# Patient Record
Sex: MALE | Race: WHITE | URBAN - METROPOLITAN AREA
[De-identification: names, ages, dates, MRNs, and addresses within clinical notes are randomized per-mention and may not be internally consistent; named-entity substitution may affect disease eponyms.]

---

## 2017-05-15 ENCOUNTER — TELEPHONE (OUTPATIENT)
Dept: SLEEP MEDICINE | Facility: CLINIC | Age: 57
End: 2017-05-15

## 2017-05-15 DIAGNOSIS — G47.33 OBSTRUCTIVE SLEEP APNEA: Primary | ICD-10-CM

## 2017-05-15 NOTE — TELEPHONE ENCOUNTER
56 y/o male with mild obstructive sleep apnea 2010 status post UAS right 2011 with Apnex device that is no longer supportable. His current device is not operable and he is requesting explant/implant as a single procedure.  Given baseline disease, he may not qualify.    Plan: New baseline study with clinical followup with me.   Appointment with Dr. Dover for endoscopic exam and initiate medical coverage if qualifies.  Explant by July 2017 to qualify for coverage.  Implant same date if patient qualifies.    Con Iber

## 2017-05-16 DIAGNOSIS — G47.33 OSA (OBSTRUCTIVE SLEEP APNEA): Primary | ICD-10-CM

## 2017-05-16 RX ORDER — CEFAZOLIN SODIUM 1 G/50ML
3 SOLUTION INTRAVENOUS
Status: CANCELLED | OUTPATIENT
Start: 2017-05-16

## 2017-05-16 NOTE — PROGRESS NOTES
Patient is an Apnex trial patient who needs to have his Apnex device removed by July 17. 2017.  Surgical orders placed today.  I will see patient in clinic on June 15th for consultation.

## 2017-05-22 ENCOUNTER — THERAPY VISIT (OUTPATIENT)
Dept: SLEEP MEDICINE | Facility: CLINIC | Age: 57
End: 2017-05-22
Attending: INTERNAL MEDICINE
Payer: COMMERCIAL

## 2017-05-22 DIAGNOSIS — G47.33 OBSTRUCTIVE SLEEP APNEA: ICD-10-CM

## 2017-05-22 PROCEDURE — 95810 POLYSOM 6/> YRS 4/> PARAM: CPT | Mod: ZF

## 2017-05-22 NOTE — MR AVS SNAPSHOT
After Visit Summary   5/22/2017    Eleazar Hummel    MRN: [id number]           Patient Information     Date Of Birth          1960        Visit Information        Provider Department      5/22/2017 8:00 PM SLEEP STUDY RM 6 [facility], [place], Sleep Study        Today's Diagnoses     Obstructive sleep apnea          Care Instructions    [facility]    1. Your sleep study will be reviewed by a sleep physician within the next few days.     2. Please follow up in the sleep clinic as scheduled, or, make an appointment with your sleep provider to be seen within two weeks to discuss the results of the sleep study.    3. If you have any questions or problems with your treatment plan, please contact your sleep clinic provider at [phone] to further manage your condition.    4. Please review your attached medication list, and, at your follow-up appointment advise your sleep clinic provider about any changes.    5. Go to [email or web address] for more information about your sleep problems.    Matilda Brizuela [facility]  May 22, 2017                Follow-ups after your visit        Your next 10 appointments already scheduled     Stanislaw 15, 2017 11:30 AM CDT   (Arrive by 11:15 AM)   NEW SLEEP ENT with Rachael Dover MD   [facility] ([facility])    [address] [place] [address] [id number] [phone]            Stanislaw 15, 2017  1:00 PM CDT   (Arrive by 12:45 PM)   PAC EVALUATION with Uc Pac Kathi 1   [facility] ([facility] [place])    [address] [place] [id number] [phone]            Stanislaw 15, 2017  2:00 PM CDT   (Arrive by 1:45 PM)   PAC RN ASSESSMENT with Uc Pac Rn   [facility] ([facility])    [address] [place] [address] [id number] [phone]             "Stanislaw 15, 2017  2:30 PM CDT   (Arrive by 2:15 PM)   PAC Anesthesia Consult with  Pac Anesthesiologist   [facility] ([facility] [place])    [address] [place] [address]  4th Floor  [facility] [id number] [phone]            Jun 28, 2017   Procedure with Rachael Dover MD   [facility] ([facility])    [address] [place] [address]  5th Floor  [facility] [id number] [phone]           Located in the Clinics and Surgery Center at [address] [place] [address].   parking is very convenient and highly recommended.  is a $6 flat rate fee.  Both  and self parkers should enter the main arrival plaza from [facility]; parking attendants will direct you based on your parking preference.              Who to contact     If you have questions or need follow up information about today's clinic visit or your schedule please contact [facility] directly at [phone].  Normal or non-critical lab and imaging results will be communicated to you by [employer], letter or phone within 4 business days after the clinic has received the results. If you do not hear from us within 7 days, please contact the clinic through [employer] or phone. If you have a critical or abnormal lab result, we will notify you by phone as soon as possible.  Submit refill requests through [employer] or call your pharmacy and they will forward the refill request to us. Please allow 3 business days for your refill to be completed.          Additional Information About Your Visit        [employer] Information     [employer] lets you send messages to your doctor, view your test results, renew your prescriptions, schedule appointments and more. To sign up, go to [email or web address] . Click on \"Log in\" on the left side of the screen, which will take you to the Welcome page. Then click on \"Sign up Now\" on the right side of the " page.     You will be asked to enter the access code listed below, as well as some personal information. Please follow the directions to create your username and password.     Your access code is: [email or web address]  Expires: 2017  2:09 PM     Your access code will  in 90 days. If you need help or a new code, please call your [place] clinic or [phone].        Care EveryWhere ID     This is your Care EveryWhere ID. This could be used by other organizations to access your [place] medical records  [id number]         Blood Pressure from Last 3 Encounters:   10/23/12 112/72   12 123/85   11 129/87    Weight from Last 3 Encounters:   10/23/12 120.7 kg (266 lb)   12 120.2 kg (265 lb)   11 120.7 kg (266 lb)              We Performed the Following     Comprehensive Sleep Study        Primary Care Provider Fax #    [facility] [phone]       [facility] [address]  [facility] [address]        Thank you!     Thank you for choosing [facility], SLEEP STUDY  for your care. Our goal is always to provide you with excellent care. Hearing back from our patients is one way we can continue to improve our services. Please take a few minutes to complete the written survey that you may receive in the mail after your visit with us. Thank you!             Your Updated Medication List - Protect others around you: Learn how to safely use, store and throw away your medicines at www.disposemymeds.org.          This list is accurate as of: 17 11:59 PM.  Always use your most recent med list.                   Brand Name Dispense Instructions for use    aspirin 81 MG tablet      Take 1 tablet by mouth daily.       multivitamin per tablet      Take 1 tablet by mouth daily.

## 2017-05-23 NOTE — PATIENT INSTRUCTIONS
[place] [facility]    1. Your sleep study will be reviewed by a sleep physician within the next few days.     2. Please follow up in the sleep clinic as scheduled, or, make an appointment with your sleep provider to be seen within two weeks to discuss the results of the sleep study.    3. If you have any questions or problems with your treatment plan, please contact your sleep clinic provider at [phone] to further manage your condition.    4. Please review your attached medication list, and, at your follow-up appointment advise your sleep clinic provider about any changes.    5. Go to [email or web address] for more information about your sleep problems.    Matilda Brizuela RPSGT  May 22, 2017

## 2017-05-26 NOTE — PROCEDURES
" SLEEP STUDY INTERPRETATION  POLYSOMNOGRAPHY REPORT      Patient: Eleazar Hummel  YOB: 1960  Study Date: 5/22/2017  MRN: [id number]  Referring Provider: unknown  Ordering Provider: Franck Lake MD    Indications for Polysomnography: The patient is a 57 y old Male who is 6' 5\" and weighs 265.0 lbs.  His BMI is 31.6, [place] sleepiness scale 12.0 and neck size is 47cm.  Relevant medical history includes with a prior history of obstructive sleep apnea and hypoglossal nerve stimulator implant which is no longer functioning.  A diagnostic polysomnogram was performed to evaluate for current severity of sleep apnea.     Polysomnogram Data:  A full night polysomnogram recorded the standard physiologic parameters including EEG, EOG, EMG, ECG, nasal and oral airflow.  Respiratory parameters of chest and abdominal movements were recorded with respiratory inductance plethysmography.  Oxygen saturation was recorded by pulse oximetry.      Sleep Architecture: Frequent arousals attributable to respiratory events and to a lesser extent, limb movements.   The total recording time of the polysomnogram was 478.6 minutes.  The total sleep time was 384.0 minutes.  Sleep latency was normal at 24.6 minutes without the use of a sleep aid.  REM latency was 113.5 minutes.  Arousal index was increased at 73.1 arousals per hour.  Sleep efficiency was decreased at 80.2%.  Wake after sleep onset was 69.5 minutes.  The patient spent 8.7% of total sleep time in Stage N1, 81.4% in Stage N2, 0.0% in Stages N3, and 9.9% in REM.  Time in REM supine was 3.5 minutes.    Respiration: Severe obstructive sleep apnea worse supine..     Events - The polysomnogram revealed a presence of 55 obstructive, 12 central, and 1 mixed apneas resulting in an apnea index of 10.6 events per hour.  There were 136 hypopneas resulting in a hypopnea index of 21.3 events per hour.  The combined apnea/hypopnea index was 31.9 events per hour.  The REM AHI was " 61.6 events per hour.  The supine AHI was 52.5 events per hour.  The RERA index was 24.7 events per hour.   The RDI was 56.6 events per hour.    Snoring - was reported as loud\intermittent.    Respiratory rate and pattern - was notable for respiratory rate and pattern.    Sustained Sleep Associated Hypoventilation - Transcutaneous carbon dioxide monitoring was not used, however significant hypoventilation was not suggested by oximetry.    Sleep Associated Hypoxemia - (Greater than 5 minutes O2 sat below 89%) was not present.  Baseline oxygen saturation was 93.9%. Lowest oxygen saturation was 82.1%.  Time spent less than or equal to 88% was 3.6 minutes.  Time spent less than or equal to 89% was 4.7 minutes.  56.6 24.7 31.9     Movement Activity: Frequent periodic limb movements with arousals.    Periodic Limb Activity - There were 154 PLMs during the entire study. The PLM index was 24.1 movements per hour.  The PLM Arousal Index was 7.3 per hour.    REM EMG Activity - Excessive transient / sustained muscle activity was not present.    Nocturnal Behavior - Abnormal sleep related behaviors were not noted.    Bruxism - None apparent.    Cardiac Summary: Normal sinus rhythm.   The average pulse rate was 67.9 bpm.  The minimum pulse rate was 55.9 bpm while the maximum pulse rate was 101.2 bpm. The rhythm is normal sinus. Arrhythmias were not noted.      Assessment:     Severe obstructive sleep apnea with sleep disruption.     Frequent periodic limb movements with arousals.    Recommendations:    Patient to be re-evaluated with endoscopy for possible neurostimulator vs CPAP therapy.    Consider evaluation for RLS or periodic limb movement disorder.     Advise regarding the risks of drowsy driving.    Suggest optimizing sleep schedule and avoiding sleep deprivation.    Weight management     Diagnostic Codes:     Obstructive Sleep Apnea G47.33    Repetitive Intrusions Into Sleep F51.8          _____________________________________   Electronically-Signed by Franck Lake 11:19 5/26/17

## 2017-06-15 ENCOUNTER — OFFICE VISIT (OUTPATIENT)
Dept: SURGERY | Facility: CLINIC | Age: 57
End: 2017-06-15

## 2017-06-15 ENCOUNTER — ALLIED HEALTH/NURSE VISIT (OUTPATIENT)
Dept: SURGERY | Facility: CLINIC | Age: 57
End: 2017-06-15

## 2017-06-15 ENCOUNTER — ANESTHESIA EVENT (OUTPATIENT)
Dept: SURGERY | Facility: AMBULATORY SURGERY CENTER | Age: 57
End: 2017-06-15

## 2017-06-15 ENCOUNTER — OFFICE VISIT (OUTPATIENT)
Dept: OTOLARYNGOLOGY | Facility: CLINIC | Age: 57
End: 2017-06-15

## 2017-06-15 VITALS
BODY MASS INDEX: 33.4 KG/M2 | OXYGEN SATURATION: 96 % | RESPIRATION RATE: 14 BRPM | SYSTOLIC BLOOD PRESSURE: 129 MMHG | DIASTOLIC BLOOD PRESSURE: 80 MMHG | WEIGHT: 282.9 LBS | HEIGHT: 77 IN | HEART RATE: 70 BPM | TEMPERATURE: 97.7 F

## 2017-06-15 VITALS — WEIGHT: 279 LBS | BODY MASS INDEX: 32.94 KG/M2 | HEIGHT: 77 IN

## 2017-06-15 DIAGNOSIS — G47.33 OSA (OBSTRUCTIVE SLEEP APNEA): Primary | ICD-10-CM

## 2017-06-15 DIAGNOSIS — Z01.818 PREOP EXAMINATION: Primary | ICD-10-CM

## 2017-06-15 RX ORDER — CEFAZOLIN SODIUM 1 G/50ML
3 SOLUTION INTRAVENOUS
Status: CANCELLED | OUTPATIENT
Start: 2017-06-15

## 2017-06-15 RX ORDER — GLYCOPYRROLATE 0.2 MG/ML
0.2 INJECTION, SOLUTION INTRAMUSCULAR; INTRAVENOUS ONCE
Status: CANCELLED | OUTPATIENT
Start: 2017-06-15 | End: 2017-06-15

## 2017-06-15 RX ORDER — OXYMETAZOLINE HYDROCHLORIDE 0.05 G/100ML
2 SPRAY NASAL 2 TIMES DAILY
Status: CANCELLED | OUTPATIENT
Start: 2017-06-15

## 2017-06-15 ASSESSMENT — PAIN SCALES - GENERAL: PAINLEVEL: NO PAIN (0)

## 2017-06-15 ASSESSMENT — LIFESTYLE VARIABLES: TOBACCO_USE: 0

## 2017-06-15 NOTE — PROGRESS NOTES
SLEEP SURGERY CONSULTATION    Patient: Eleazar Hummel  : 1960  CHIEF COMPLAINT:     IDENTIFICATION: Dr. Lake consulted Dr. Dover for surgical evaluation and possible treatment of obstructive sleep apnea syndrome for Eleazar Hummel.    HPI:  Eleazar Hummel is a 57 year old year old male who has Obstructive Sleep Apnea.  The patient has a history of severe obstructive sleep apnea.  The patient reports that when he initially was diagnosed with sleep apnea, he was placed on CPAP.  He had an extremely difficult time tolerating CPAP due to mask discomfort as well as dealing with multiple air leaks.  He tried CPAP for one year without any improvement in his ability to tolerate CPAP.  He has tried three to four different types of masks including full face mask as well as nasal masks.  After CPAP, he then tried an oral appliance.  He was unable to tolerate the oral appliance due to extreme jaw pain.  He then was part of the Apnex hypoglossal nerve stimulator trial.  He underwent implantation of a right hypoglossal nerve stimulator on 2010.  He was able to use the Apnex implant quite well without any significant issues.  There was a device failure early on in which the stimulator needed to be replaced, but this was done so without difficulty.  He felt that the implant had a significant impact on his sleep quality as well as daytime sleepiness.  More recently, he has noticed that the implant is not turning on and since that time he has noticed that his sleep quality has deteriorated.  He is interested in being a candidate for the upper new upper airway stimulation device, Inspire.  He also presents for removal of his Apnex implant.  He recently had a new sleep study performed here at the [facility] on 2017.  His AHI was 31.9.  His lowest oxygen saturation was 82%.  He had 55 obstructive apneas, 12 central apneas, 1 mixed apnea, and 136 hypopneas.       PAST MEDICAL  "HISTORY:  Past Medical History:   Diagnosis Date     Obstructive sleep apnea 2009     BRIANNA (obstructive sleep apnea)        PAST SURGICAL HISTORY:  Past Surgical History:   Procedure Laterality Date     BACK SURGERY  1998    bulging disk     Bladder surgery  1971     ENT SURGERY  2011       MEDICATIONS:  Current Outpatient Prescriptions   Medication Sig Dispense Refill     aspirin 81 MG tablet Take 1 tablet by mouth daily.       Multiple Vitamin (MULTIVITAMIN) per tablet Take 1 tablet by mouth daily.         ALLERGIES:  No Known Allergies    SOCIAL HISTORY:  Social History     Social History     Marital status:      Spouse name: N/A     Number of children: N/A     Years of education: N/A     Occupational History     Not on file.     Social History Main Topics     Smoking status: Never Smoker     Smokeless tobacco: Never Used     Alcohol use Yes      Comment: moderate     Drug use: No     Sexual activity: Yes     Partners: Female     Birth control/ protection: Female Surgical     Other Topics Concern     Not on file     Social History Narrative       FAMILY HISTORY:  Family History   Problem Relation Age of Onset     Hypertension Mother      Migraines Mother      Alzheimer Disease Mother      Colon Cancer Father        REVIEW OF SYSTEMS:   ENT ROS 6/11/2017   Constitutional Unexplained fatigue, Problems with sleep   Musculoskeletal Sore or stiff joints         PHYSICAL EXAM  Ht: 6'5\"  Wt: 265  BMI: 31.6    Constitutional: healthy, alert and no distress  ENT:   MOUTH:   MMM 3, CN 12 intact, though with protrusion, there is a slight deviation to right.    EYES: extraocular movements intact  NECK: The patient had a right neck incision that is well-healed.  His chest incision is intact as well.  I am able to feel the Apnex implant that appears stable.  He does have two rib incisions, one on each side.         ASSESSMENT:  1.  Severe obstructive sleep apnea,  untreated    Incompletely treated sleep apnea elevates " risk of death, cardiovascular disease, and motor vehicle crashes, and it creates deficits in daytime function and quality of life.  Surgical treatment can improve these clinically important outcomes; therefore surgical treatment is medically necessary if the patient is not using CPAP adequately.       PLAN:  1.  We discussed obstructive sleep apnea, including pathophysiology and consequences.  We provided the patient with written information about obstructive sleep apnea and its management.  We discussed the beneficial relationship between weight loss and sleep apnea.      2.  We will be removing the Apnex implant as it no longer a supported therapy.  I discussed with the patient that we will remove the generator.  Typically we will cap off the stim and sense leads.  I did ask the patient if he wanted me to remove the breathing sensing leads and he did not feel it was necessary.      He is interested in Inspire therapy.  He meets three out of the four criteria in terms of sleep apnea severity on recent sleep study, CPAP intolerance as well as oral appliance intolerance.  His BMI is appropriate.  We would next need to proceed with a drug-induced sleep endoscopy in order to determine his airway anatomy.  We can easily tack this on prior to his implant removal.  I explained to him that the therapy would work very similarly to Apnex.      I also discussed with him what is involved with insurance authorization and so he is aware of that pathway.  He understands that we will not be able to explant and implant him as a single procedure.  He is okay with this.       I spent 35 minutes face-to-face with Eleazar Hummel during today's office visit, of which more than 50% was spent on counseling and coordination of care, which included discussion of pathophysiology of patient's obstructive sleep apnea, treatment options, risks and benefits of each option.

## 2017-06-15 NOTE — LETTER
6/15/2017       RE: Eleazar Hummel  [address] [facility] [id number]     Dear Colleague,    Thank you for referring your patient, Eleazar Hummel, to the [facility] EAR [facility] THROAT at [facility]. Please see a copy of my visit note below.        SLEEP SURGERY CONSULTATION    Patient: Eleazar Hummel  : 1960  CHIEF COMPLAINT:     IDENTIFICATION: Dr. Lake consulted Dr. Dover for surgical evaluation and possible treatment of obstructive sleep apnea syndrome for Eleazar Hummel.    HPI:  Eleazar Hummel is a 57 year old year old male who has Obstructive Sleep Apnea.  The patient has a history of severe obstructive sleep apnea.  The patient reports that when he initially was diagnosed with sleep apnea, he was placed on CPAP.  He had an extremely difficult time tolerating CPAP due to mask discomfort as well as dealing with multiple air leaks.  He tried CPAP for one year without any improvement in his ability to tolerate CPAP.  He has tried three to four different types of masks including full face mask as well as nasal masks.  After CPAP, he then tried an oral appliance.  He was unable to tolerate the oral appliance due to extreme jaw pain.  He then was part of the Apnex hypoglossal nerve stimulator trial.  He underwent implantation of a right hypoglossal nerve stimulator on 2010.  He was able to use the Apnex implant quite well without any significant issues.  There was a device failure early on in which the stimulator needed to be replaced, but this was done so without difficulty.  He felt that the implant had a significant impact on his sleep quality as well as daytime sleepiness.  More recently, he has noticed that the implant is not turning on and since that time he has noticed that his sleep quality has deteriorated.  He is interested in being a candidate for the upper new upper airway stimulation device, Inspire.  He also presents  "for removal of his Apnex implant.  He recently had a new sleep study performed here at the [facility] on 05/22/2017.  His AHI was 31.9.  His lowest oxygen saturation was 82%.  He had 55 obstructive apneas, 12 central apneas, 1 mixed apnea, and 136 hypopneas.       PAST MEDICAL HISTORY:  Past Medical History:   Diagnosis Date     Obstructive sleep apnea 2009     BRIANNA (obstructive sleep apnea)        PAST SURGICAL HISTORY:  Past Surgical History:   Procedure Laterality Date     BACK SURGERY  1998    bulging disk     Bladder surgery  1971     ENT SURGERY  2011       MEDICATIONS:  Current Outpatient Prescriptions   Medication Sig Dispense Refill     aspirin 81 MG tablet Take 1 tablet by mouth daily.       Multiple Vitamin (MULTIVITAMIN) per tablet Take 1 tablet by mouth daily.         ALLERGIES:  No Known Allergies    SOCIAL HISTORY:  Social History     Social History     Marital status:      Spouse name: N/A     Number of children: N/A     Years of education: N/A     Occupational History     Not on file.     Social History Main Topics     Smoking status: Never Smoker     Smokeless tobacco: Never Used     Alcohol use Yes      Comment: moderate     Drug use: No     Sexual activity: Yes     Partners: Female     Birth control/ protection: Female Surgical     Other Topics Concern     Not on file     Social History Narrative       FAMILY HISTORY:  Family History   Problem Relation Age of Onset     Hypertension Mother      Migraines Mother      Alzheimer Disease Mother      Colon Cancer Father        REVIEW OF SYSTEMS:   ENT ROS 6/11/2017   Constitutional Unexplained fatigue, Problems with sleep   Musculoskeletal Sore or stiff joints         PHYSICAL EXAM  Ht: 6'5\"  Wt: 265  BMI: 31.6    Constitutional: healthy, alert and no distress  ENT:   MOUTH:   MMM 3, CN 12 intact, though with protrusion, there is a slight deviation to right.    EYES: extraocular movements intact  NECK: The patient had a right neck " incision that is well-healed.  His chest incision is intact as well.  I am able to feel the Apnex implant that appears stable.  He does have two rib incisions, one on each side.         ASSESSMENT:  1.  Severe obstructive sleep apnea,  untreated    Incompletely treated sleep apnea elevates risk of death, cardiovascular disease, and motor vehicle crashes, and it creates deficits in daytime function and quality of life.  Surgical treatment can improve these clinically important outcomes; therefore surgical treatment is medically necessary if the patient is not using CPAP adequately.       PLAN:  1.  We discussed obstructive sleep apnea, including pathophysiology and consequences.  We provided the patient with written information about obstructive sleep apnea and its management.  We discussed the beneficial relationship between weight loss and sleep apnea.      2.  We will be removing the Apnex implant as it no longer a supported therapy.  I discussed with the patient that we will remove the generator.  Typically we will cap off the stim and sense leads.  I did ask the patient if he wanted me to remove the breathing sensing leads and he did not feel it was necessary.      He is interested in Inspire therapy.  He meets three out of the four criteria in terms of sleep apnea severity on recent sleep study, CPAP intolerance as well as oral appliance intolerance.  His BMI is appropriate.  We would next need to proceed with a drug-induced sleep endoscopy in order to determine his airway anatomy.  We can easily tack this on prior to his implant removal.  I explained to him that the therapy would work very similarly to Apnex.      I also discussed with him what is involved with insurance authorization and so he is aware of that pathway.  He understands that we will not be able to explant and implant him as a single procedure.  He is okay with this.       I spent 35 minutes face-to-face with Eleazar Hummel during today's  office visit, of which more than 50% was spent on counseling and coordination of care, which included discussion of pathophysiology of patient's obstructive sleep apnea, treatment options, risks and benefits of each option.      Again, thank you for allowing me to participate in the care of your patient.      Sincerely,    Rachael Dover MD

## 2017-06-15 NOTE — MR AVS SNAPSHOT
After Visit Summary   6/15/2017    Eleazar Hummel    MRN: [id number]           Patient Information     Date Of Birth          1960        Visit Information        Provider Department      6/15/2017 11:30 AM Rachael Dover MD [facility]        Today's Diagnoses     BRIANNA (obstructive sleep apnea)    -  1       Follow-ups after your visit        Your next 10 appointments already scheduled     Jun 28, 2017   Procedure with Rachael Dover MD   [facility] ([facility])    [address] [place] [address]  5th [facility] [address] [phone]           Located in the Clinics and Surgery Center at [address] [place] [address].   parking is very convenient and highly recommended.  is a $6 flat rate fee.  Both  and self parkers should enter the main arrival plaza from [facility]; parking attendants will direct you based on your parking preference.              Who to contact     Please call your clinic at [phone] to:    Ask questions about your health    Make or cancel appointments    Discuss your medicines    Learn about your test results    Speak to your doctor   If you have compliments or concerns about an experience at your clinic, or if you wish to file a complaint, please contact [facility] Patient Relations at [phone] or email us at [email or web address]         Additional Information About Your Visit        MyChart Information     Kapturt gives you secure access to your electronic health record. If you see a primary care provider, you can also send messages to your care team and make appointments. If you have questions, please call your primary care clinic.  If you do not have a primary care provider, please call [phone] and they will assist you.      IdealSeat is an electronic gateway that provides easy, online access to your  "medical records. With PayNearMe, you can request a clinic appointment, read your test results, renew a prescription or communicate with your care team.     To access your existing account, please contact your [facility] or call [phone] for assistance.        Care EveryWhere ID     This is your Care EveryWhere ID. This could be used by other organizations to access your [place] medical records  [id number]        Your Vitals Were     Height BMI (Body Mass Index)                1.945 m (6' 4.58\") 33.45 kg/m2           Blood Pressure from Last 3 Encounters:   06/15/17 129/80   10/23/12 112/72   07/24/12 123/85    Weight from Last 3 Encounters:   06/15/17 128.3 kg (282 lb 14.4 oz)   06/15/17 126.6 kg (279 lb)   10/23/12 120.7 kg (266 lb)              We Performed the Following     Ashlee-Operative Worksheet (ENT General)        Primary Care Provider Fax #    [facility] [phone]       [facility] [address]  [facility] [address]        Thank you!     Thank you for choosing [facility]  for your care. Our goal is always to provide you with excellent care. Hearing back from our patients is one way we can continue to improve our services. Please take a few minutes to complete the written survey that you may receive in the mail after your visit with us. Thank you!             Your Updated Medication List - Protect others around you: Learn how to safely use, store and throw away your medicines at www.disposemymeds.org.      Notice  As of 6/15/2017 11:59 PM    You have not been prescribed any medications.      "

## 2017-06-15 NOTE — NURSING NOTE
Chief Complaint   Patient presents with     Consult     New Sleep ENT Discuss apnex removal EMR     Pt states no pain today.    N Ramses PRATERN

## 2017-06-15 NOTE — MR AVS SNAPSHOT
After Visit Summary   6/15/2017    Eleazar Hummel    MRN: [id number]           Patient Information     Date Of Birth          1960        Visit Information        Provider Department      6/15/2017 2:00 PM Rn, [facility]        Care Instructions    Preparing for Your Surgery      Name:  Eleazar Hummel   MRN:  [id number]   :  1960   Today's Date:  6/15/2017     Arriving for surgery:  Surgery date:  17  Surgery time:  8:50 am  Arrival time:  7:20 am  Please come to:     [facility]  [address] [place] [id number]     Parking is available in front of the Clinics and Surgery Center building from 5:30AM to 8:00PM.  -  Proceed to the 5th floor to check into the Ambulatory Surgery Center.              >> There will be patient concierges on the 1st and 5th floor, for assistance or an escort, if you would like.              >> Please call [phone] with any questions.    What can I eat or drink?  -  You may have solid food or milk products until 8 hours prior to your surgery.  -  You may have water, apple juice or 7up/Sprite until 2 hours prior to your surgery.    Which medicines can I take?  -  Please take these medications the day of surgery: Tylenol is OK if needed      How do I prepare myself?  -  Take two showers: one the night before surgery; and one the morning of surgery.         Use Scrubcare or Hibiclens to wash from neck down.  You may use your own shampoo and conditioner. No other hair products.   -  Do NOT use lotion, powder, deodorant, or antiperspirant the day of your surgery.  -  Do NOT wear any makeup, fingernail polish or jewelry.  -  Do not bring your own medications to the hospital, except for inhalers and eye drops.  -  Bring your ID and insurance card.    Questions or Concerns:  If you have questions or concerns, please call the  Preoperative Assessment Center, Monday-Friday  7AM-7PM:  [phone]              AFTER YOUR SURGERY  Breathing exercises   Breathing exercises help you recover faster. Take deep breaths and let the air out slowly. This will:     Help you wake up after surgery.    Help prevent complications like pneumonia.  Preventing complications will help you go home sooner.   We may give you a breathing device (incentive spirometer) to encourage you to breathe deeply.   Nausea and vomiting   You may feel sick to your stomach after surgery; if so, let your nurse know.    Pain control:  After surgery, you may have pain. Our goal is to help you manage your pain. Pain medicine will help you feel comfortable enough to do activities that will help you heal.  These activities may include breathing exercises, walking and physical therapy.   To help your health care team treat your pain we will ask: 1) If you have pain  2) where it is located 3) describe your pain in your words  Methods of pain control include medications given by mouth, vein or by nerve block for some surgeries.  We may give you a pain control pump that will:  1) Deliver the medicine through a tube placed in your vein  2) Control the amount of medicine you receive  3) Allow you to push a button to deliver a dose of pain medicine  Sequential Compression Device (SCD) or Pneumo Boots:  You may need to wear SCD S on your legs or feet. These are wraps connected to a machine that pumps in air and releases it. The repeated pumping helps prevent blood clots from forming.           Follow-ups after your visit        Your next 10 appointments already scheduled     Stanislaw 15, 2017  2:00 PM CDT   (Arrive by 1:45 PM)   PAC RN ASSESSMENT with Viky Pac Rn   [facility] Preoperative [facility] ([facility])    [address] [id number] [phone]            Stanislaw 15, 2017  2:30 PM CDT   (Arrive by 2:15 PM)   PAC Anesthesia Consult with Viky Pac Anesthesiologist   [facility]  Pre[facility] ([facility])    [address] [place] [address]  4th Floor  [facility] [address] [phone]            Stanislaw 15, 2017  2:45 PM CDT   LAB with  LAB   [facility] ([facility])    [address] [place] [address]  1st [facility] [address] [phone]           Patient must bring picture ID.  Patient should be prepared to give a urine specimen  Please do not eat 10-12 hours before your appointment if you are coming in fasting for labs on lipids, cholesterol, or glucose (sugar).  Pregnant women should follow their Care Team instructions. Water with medications is okay. Do not drink coffee or other fluids.   If you have concerns about taking  your medications, please ask at office or if scheduling via [facility], send a message by clicking on Secure Messaging, Message Your Care Team.            Jun 28, 2017   Procedure with Rachael Dover MD   [facility] ([facility])    [address] [place] [address]  5th [facility] [address] [phone]           Located in the Clinics and Surgery Center at [address] [place] [address].   parking is very convenient and highly recommended.  is a $6 flat rate fee.  Both  and self parkers should enter the main arrival plaza from [facility]; parking attendants will direct you based on your parking preference.              Who to contact     Please call your clinic at [phone] to:    Ask questions about your health    Make or cancel appointments    Discuss your medicines    Learn about your test results    Speak to your doctor   If you have compliments or concerns about an experience at your clinic, or if you wish to file a complaint, please contact [facility] Patient Relations at [phone] or email us at [email or web address]         Additional Information About Your Visit         Sion Powerhart Information     Ferevo gives you secure access to your electronic health record. If you see a primary care provider, you can also send messages to your care team and make appointments. If you have questions, please call your primary care clinic.  If you do not have a primary care provider, please call [phone] and they will assist you.      Ferevo is an electronic gateway that provides easy, online access to your medical records. With Ferevo, you can request a clinic appointment, read your test results, renew a prescription or communicate with your care team.     To access your existing account, please contact your [facility] or call [phone] for assistance.        Care EveryWhere ID     This is your Care EveryWhere ID. This could be used by other organizations to access your [place] medical records  [id number]         Blood Pressure from Last 3 Encounters:   06/15/17 129/80   10/23/12 112/72   07/24/12 123/85    Weight from Last 3 Encounters:   06/15/17 128.3 kg (282 lb 14.4 oz)   06/15/17 126.6 kg (279 lb)   10/23/12 120.7 kg (266 lb)              Today, you had the following     No orders found for display       Primary Care Provider Fax #    [facility] [phone]       [facility] [address] [place]  [facility] [address]        Thank you!     Thank you for choosing [facility] PREOPERATIVE ASSESSMENT [facility]  for your care. Our goal is always to provide you with excellent care. Hearing back from our patients is one way we can continue to improve our services. Please take a few minutes to complete the written survey that you may receive in the mail after your visit with us. Thank you!             Your Updated Medication List - Protect others around you: Learn how to safely use, store and throw away your medicines at www.disposemymeds.org.      Notice  As of 6/15/2017  1:14 PM    You have not been prescribed any medications.

## 2017-06-15 NOTE — H&P
Pre-Operative H & P     CC:  Preoperative exam to assess for increased cardiopulmonary risk while undergoing surgery and anesthesia.    Date of Encounter: 6/15/2017  Primary Care Physician:  [facility]    [facility]  Eleazar Hummel is a 57 year old male who presents for pre-operative H & P in preparation for drug induced sleep endoscopy, removal of right Apnex implant with Dr. Dover on 6/2817 at [facility]. History is obtained from the patient.     Patient with moderate BRIANNA s/p hypoglossal nerve stimulator placed and later revised in 2010-11, as part of a Apnex clinical study. He consulted recently with Dr. Dover in preparation for removal, follow up evaluation and consideration for Inspire device placement. He has found the nerve stimulator to be very helpful in treatment of his BRIANNA symptoms.    Past Medical History  Past Medical History:   Diagnosis Date     Obstructive sleep apnea 2009       Past Surgical History  Past Surgical History:   Procedure Laterality Date     BACK SURGERY  1998    bulging disk     Bladder surgery  1971     COLONOSCOPY       Implantation of hypoglossal nerve stimulator Right 2010     Revision of right hypoglossal nerve stimulator  2011       Hx of Blood transfusions/reactions: Denies.      Hx of abnormal bleeding or anti-platelet use: Denies.    Menstrual history: No LMP for male patient.    Steroid use in the last year: Denies.     Personal or FH with difficulty with Anesthesia:  Denies.    Prior to Admission Medications  No current outpatient prescriptions on file.       Allergies  No Known Allergies    Social History  Social History     Social History     Marital status:      Spouse name: N/A     Number of children: N/A     Years of education: N/A     Occupational History     Not on file.     Social History Main Topics     Smoking status: Never Smoker     Smokeless tobacco: Never Used     Alcohol use Yes      Comment: occasional     Drug  "use: No     Sexual activity: Yes     Partners: Female     Birth control/ protection: Female Surgical     Other Topics Concern     Not on file     Social History Narrative       Family History  Family History   Problem Relation Age of Onset     Hypertension Mother      Migraines Mother      Alzheimer Disease Mother      Colon Cancer Father        Review of Systems  The complete review of systems is negative other than noted in the HPI or here.   Constitutional: Denies fever, chills, weight loss.  HEENT: Wears glasses for vision.  Respiratory: Denies cough or shortness of breath. Able to lie flat.  CV: Denies chest pain or irregular HR. Generally good exercise tolerance but doesn't exercise much.  GI: Denies abdominal pain or bowel issues.  : Denies dysuria.  M/S: Knee pain.    Temp: 97.7  F (36.5  C) Temp src: Oral BP: 129/80 Pulse: 70   Resp: 14 SpO2: 96 %         282 lbs 14.4 oz  6' 4.5\"   Body mass index is 33.99 kg/(m^2).       Physical Exam  Constitutional: Awake, alert, cooperative, no apparent distress, and appears stated age.  Eyes: Pupils equal, round and reactive to light, extra ocular muscles intact, sclera clear, conjunctiva normal. Glasses on.  HENT: Normocephalic, oral pharynx with moist mucus membranes, good dentition. No goiter appreciated.   Respiratory: Clear to auscultation bilaterally, no crackles or wheezing. No cough or obvious dyspnea.  Cardiovascular: Regular rate and rhythm, normal S1 and S2, and no murmur noted. Carotids, no bruits. No edema. Palpable pulses to radial  DP and PT arteries.   GI: Normal bowel sounds, soft, non-distended, non-tender, no masses palpated, soft umbilical hernia noted. Difficult exam due to obese abdomen  Lymph/Hematologic: No cervical lymphadenopathy and no supraclavicular lymphadenopathy.  Genitourinary: Deferred.  Skin: Warm and dry.  No rashes at anticipated surgical site.   Musculoskeletal: Full ROM of neck. There is no redness, warmth, or swelling of the " joints. Gross motor strength is normal.    Neurologic: Awake, alert, oriented to name, place and time. Cranial nerves II-XII are grossly intact. Gait is normal.   Neuropsychiatric: Calm, cooperative. Normal affect.     Labs: Not indicated.  EKG: Not indicated.    Outside records reviewed from: Care Everywhere    ASSESSMENT and PLAN  Eleazar Hummel is a 57 year old male scheduled to undergo drug induced sleep endoscopy, removal of right Apnex implant with Dr. Dover on 6/2817. He has the following specific operative considerations:   - RCRI : No serious cardiac risks.    - Anesthesia considerations:  Refer to PAC assessment in anesthesia records  - Risk of PONV score = 1.  If > 2, anti-emetic intervention recommended.     --Moderate BRIANNA s/p hypoglossal nerve stimulator placed in 2010, and now scheduled to be removed. He will be reevaluated in consideration of the Inspire device.    --Generally healthy male with no significant cardiopulmonary history. Nonsmoker. Good activity tolerance. Mild central obesity.  Arrival time, NPO, shower and medication instructions provided by nursing staff today. Preparing For Your Surgery handout given.    Patient was discussed with Dr Jaramillo.    HOA Perez CNS  Preoperative Assessment Center  [facility]  Clinic and Surgery Center  Phone: [phone]  Fax: [phone]

## 2017-06-15 NOTE — ANESTHESIA PREPROCEDURE EVALUATION
Anesthesia Evaluation     . Pt has had prior anesthetic. Type: General and MAC    No history of anesthetic complications          ROS/MED HX    ENT/Pulmonary: Comment: Right hypoglossal nerve stimulator    (+)sleep apnea, doesn't use CPAP , . .   (-) tobacco use   Neurologic:  - neg neurologic ROS     Cardiovascular:  - neg cardiovascular ROS   (+) ----. : . . . :. . No previous cardiac testing       METS/Exercise Tolerance:  >4 METS   Hematologic:  - neg hematologic  ROS       Musculoskeletal:   (+) , , other musculoskeletal- knee pain      GI/Hepatic:  - neg GI/hepatic ROS       Renal/Genitourinary:  - ROS Renal section negative       Endo:     (+) Obesity, .      Psychiatric:  - neg psychiatric ROS       Infectious Disease:  - neg infectious disease ROS       Malignancy:      - no malignancy   Other:    (+) No chance of pregnancy C-spine cleared: N/A, no H/O Chronic Pain,no other significant disability                    Physical Exam  Normal systems: dental    Airway   Mallampati: I  TM distance: >3 FB  Neck ROM: full    Dental     Cardiovascular   Rhythm and rate: regular and normal      Pulmonary    breath sounds clear to auscultation    Other findings: For further details of assessment, testing, and physical exam please see H and P completed on same date.           PAC Discussion and Assessment    ASA Classification: 1  Case is suitable for: ASC  Anesthetic techniques and relevant risks discussed: GA  Invasive monitoring and risk discussed: No  Types:   Possibility and Risk of blood transfusion discussed: No  NPO instructions given:   Additional anesthetic preparation and risks discussed:   Needs early admission to pre-op area:   Other:     PAC Resident/NP Anesthesia Assessment:  Eleazar Hummel is a 57 year old male scheduled to undergo drug induced sleep endoscopy, removal of right Apnex implant with Dr. Dover on 6/2817. He has the following specific operative considerations:   - RCRI : No serious  cardiac risks.    - Risk of PONV score = 1.  If > 2, anti-emetic intervention recommended.    No history of problems with anesthesia.     --Moderate BRIANNA s/p hypoglossal nerve stimulator placed in 2010, and now scheduled to be removed. He will be reevaluated in consideration of the Inspire device.    --Generally healthy male with no significant cardiopulmonary history. Nonsmoker. Good activity tolerance. Mild central obesity.      Patient was discussed with Dr Jaramillo.      Reviewed and Signed by PAC Mid-Level Provider/Resident  Mid-Level Provider/Resident: HOA Roque, CNS  Date: 6/15/17  Time: 1:23pm    Attending Anesthesiologist Anesthesia Assessment:  57 year old for drug induced sleep endoscopy removal of right Apnex implant, possible implant Inspire. Chart reviewed, patient seen and evaluated; agree with above assessment. Patient has no significant cardiac or pulmonary disease      Patient is appropriate for the planned procedure without further workup or medical management change. The final anesthesia plan will be determined by the physician anesthesiologist caring for the patient on the day of surgery.      Reviewed and Signed by PAC Anesthesiologist  Anesthesiologist: indigo  Date: 6/15/2017  Time:   Pass/Fail: Pass  Disposition:     PAC Pharmacist Assessment:        Pharmacist:   Date:   Time:      Anesthesia Plan      History & Physical Review  History and physical reviewed and following examination; no interval change.    ASA Status:  2 .    NPO Status:  > 8 hours    Plan for General and LMA with Intravenous and Propofol induction. Maintenance will be Inhalation.    PONV prophylaxis:  Ondansetron (or other 5HT-3)       Postoperative Care  Postoperative pain management:  IV analgesics, Oral pain medications and Multi-modal analgesia.      Consents                          .

## 2017-06-15 NOTE — PATIENT INSTRUCTIONS
Preparing for Your Surgery      Name:  Eleazar Hummel   MRN:  [id number]   :  1960   Today's Date:  6/15/2017     Arriving for surgery:  Surgery date:  17  Surgery time:  8:50 am  Arrival time:  7:20 am  Please come to:     [facility]  [address] [place] [id number]     Parking is available in front of the Clinics and Surgery Center building from 5:30AM to 8:00PM.  -  Proceed to the 5th floor to check into the Ambulatory Surgery Center.              >> There will be patient concierges on the 1st and 5th floor, for assistance or an escort, if you would like.              >> Please call [phone] with any questions.    What can I eat or drink?  -  You may have solid food or milk products until 8 hours prior to your surgery.  -  You may have water, apple juice or 7up/Sprite until 2 hours prior to your surgery.    Which medicines can I take?  -  Please take these medications the day of surgery: Tylenol is OK if needed      How do I prepare myself?  -  Take two showers: one the night before surgery; and one the morning of surgery.         Use Scrubcare or Hibiclens to wash from neck down.  You may use your own shampoo and conditioner. No other hair products.   -  Do NOT use lotion, powder, deodorant, or antiperspirant the day of your surgery.  -  Do NOT wear any makeup, fingernail polish or jewelry.  -  Do not bring your own medications to the hospital, except for inhalers and eye drops.  -  Bring your ID and insurance card.    Questions or Concerns:  If you have questions or concerns, please call the  Preoperative Assessment Center, Monday-Friday 7AM-7PM:  [phone]              AFTER YOUR SURGERY  Breathing exercises   Breathing exercises help you recover faster. Take deep breaths and let the air out slowly. This will:     Help you wake up after surgery.    Help prevent complications like pneumonia.  Preventing complications will help you go home  sooner.   We may give you a breathing device (incentive spirometer) to encourage you to breathe deeply.   Nausea and vomiting   You may feel sick to your stomach after surgery; if so, let your nurse know.    Pain control:  After surgery, you may have pain. Our goal is to help you manage your pain. Pain medicine will help you feel comfortable enough to do activities that will help you heal.  These activities may include breathing exercises, walking and physical therapy.   To help your health care team treat your pain we will ask: 1) If you have pain  2) where it is located 3) describe your pain in your words  Methods of pain control include medications given by mouth, vein or by nerve block for some surgeries.  We may give you a pain control pump that will:  1) Deliver the medicine through a tube placed in your vein  2) Control the amount of medicine you receive  3) Allow you to push a button to deliver a dose of pain medicine  Sequential Compression Device (SCD) or Pneumo Boots:  You may need to wear SCD S on your legs or feet. These are wraps connected to a machine that pumps in air and releases it. The repeated pumping helps prevent blood clots from forming.

## 2017-06-28 ENCOUNTER — HOSPITAL ENCOUNTER (OUTPATIENT)
Facility: AMBULATORY SURGERY CENTER | Age: 57
End: 2017-06-28
Attending: OTOLARYNGOLOGY

## 2017-06-28 ENCOUNTER — SURGERY (OUTPATIENT)
Age: 57
End: 2017-06-28

## 2017-06-28 ENCOUNTER — ANESTHESIA (OUTPATIENT)
Dept: SURGERY | Facility: AMBULATORY SURGERY CENTER | Age: 57
End: 2017-06-28

## 2017-06-28 VITALS
DIASTOLIC BLOOD PRESSURE: 85 MMHG | TEMPERATURE: 97 F | WEIGHT: 280 LBS | SYSTOLIC BLOOD PRESSURE: 124 MMHG | BODY MASS INDEX: 33.06 KG/M2 | HEIGHT: 77 IN | OXYGEN SATURATION: 95 % | RESPIRATION RATE: 12 BRPM | HEART RATE: 71 BPM

## 2017-06-28 DIAGNOSIS — G89.18 POST-OP PAIN: Primary | ICD-10-CM

## 2017-06-28 DIAGNOSIS — G47.33 OSA (OBSTRUCTIVE SLEEP APNEA): Primary | ICD-10-CM

## 2017-06-28 RX ORDER — CEFAZOLIN SODIUM 1 G/50ML
3 SOLUTION INTRAVENOUS
Status: COMPLETED | OUTPATIENT
Start: 2017-06-28 | End: 2017-06-28

## 2017-06-28 RX ORDER — GLYCOPYRROLATE 0.2 MG/ML
0.2 INJECTION, SOLUTION INTRAMUSCULAR; INTRAVENOUS ONCE
Status: COMPLETED | OUTPATIENT
Start: 2017-06-28 | End: 2017-06-28

## 2017-06-28 RX ORDER — SODIUM CHLORIDE, SODIUM LACTATE, POTASSIUM CHLORIDE, CALCIUM CHLORIDE 600; 310; 30; 20 MG/100ML; MG/100ML; MG/100ML; MG/100ML
INJECTION, SOLUTION INTRAVENOUS CONTINUOUS
Status: DISCONTINUED | OUTPATIENT
Start: 2017-06-28 | End: 2017-06-28 | Stop reason: HOSPADM

## 2017-06-28 RX ORDER — CEFAZOLIN SODIUM 1 G/3ML
1 INJECTION, POWDER, FOR SOLUTION INTRAMUSCULAR; INTRAVENOUS SEE ADMIN INSTRUCTIONS
Status: DISCONTINUED | OUTPATIENT
Start: 2017-06-28 | End: 2017-06-28 | Stop reason: HOSPADM

## 2017-06-28 RX ORDER — NALOXONE HYDROCHLORIDE 0.4 MG/ML
.1-.4 INJECTION, SOLUTION INTRAMUSCULAR; INTRAVENOUS; SUBCUTANEOUS
Status: DISCONTINUED | OUTPATIENT
Start: 2017-06-28 | End: 2017-06-29 | Stop reason: HOSPADM

## 2017-06-28 RX ORDER — OXYMETAZOLINE HYDROCHLORIDE 0.05 G/100ML
SPRAY NASAL PRN
Status: DISCONTINUED | OUTPATIENT
Start: 2017-06-28 | End: 2017-06-28 | Stop reason: HOSPADM

## 2017-06-28 RX ORDER — CEFAZOLIN SODIUM 1 G/50ML
3 SOLUTION INTRAVENOUS
Status: CANCELLED | OUTPATIENT
Start: 2017-06-28

## 2017-06-28 RX ORDER — HYDROCODONE BITARTRATE AND ACETAMINOPHEN 5; 325 MG/1; MG/1
1-2 TABLET ORAL EVERY 4 HOURS PRN
Qty: 15 TABLET | Refills: 0 | Status: SHIPPED | OUTPATIENT
Start: 2017-06-28 | End: 2017-07-11

## 2017-06-28 RX ORDER — CEFAZOLIN SODIUM 1 G/50ML
3 SOLUTION INTRAVENOUS
Status: DISCONTINUED | OUTPATIENT
Start: 2017-06-28 | End: 2017-06-28 | Stop reason: HOSPADM

## 2017-06-28 RX ORDER — PROPOFOL 10 MG/ML
INJECTION, EMULSION INTRAVENOUS CONTINUOUS PRN
Status: DISCONTINUED | OUTPATIENT
Start: 2017-06-28 | End: 2017-06-28

## 2017-06-28 RX ORDER — PROPOFOL 10 MG/ML
INJECTION, EMULSION INTRAVENOUS PRN
Status: DISCONTINUED | OUTPATIENT
Start: 2017-06-28 | End: 2017-06-28

## 2017-06-28 RX ORDER — SODIUM CHLORIDE, SODIUM LACTATE, POTASSIUM CHLORIDE, CALCIUM CHLORIDE 600; 310; 30; 20 MG/100ML; MG/100ML; MG/100ML; MG/100ML
INJECTION, SOLUTION INTRAVENOUS CONTINUOUS
Status: DISCONTINUED | OUTPATIENT
Start: 2017-06-28 | End: 2017-06-29 | Stop reason: HOSPADM

## 2017-06-28 RX ORDER — HYDROCODONE BITARTRATE AND ACETAMINOPHEN 5; 325 MG/1; MG/1
1 TABLET ORAL
Status: COMPLETED | OUTPATIENT
Start: 2017-06-28 | End: 2017-06-28

## 2017-06-28 RX ORDER — LIDOCAINE HYDROCHLORIDE 40 MG/ML
SOLUTION TOPICAL PRN
Status: DISCONTINUED | OUTPATIENT
Start: 2017-06-28 | End: 2017-06-28 | Stop reason: HOSPADM

## 2017-06-28 RX ORDER — FENTANYL CITRATE 50 UG/ML
INJECTION, SOLUTION INTRAMUSCULAR; INTRAVENOUS PRN
Status: DISCONTINUED | OUTPATIENT
Start: 2017-06-28 | End: 2017-06-28

## 2017-06-28 RX ORDER — LIDOCAINE 40 MG/G
CREAM TOPICAL
Status: DISCONTINUED | OUTPATIENT
Start: 2017-06-28 | End: 2017-06-28 | Stop reason: HOSPADM

## 2017-06-28 RX ORDER — ONDANSETRON 2 MG/ML
INJECTION INTRAMUSCULAR; INTRAVENOUS PRN
Status: DISCONTINUED | OUTPATIENT
Start: 2017-06-28 | End: 2017-06-28

## 2017-06-28 RX ORDER — BACITRACIN 500 [USP'U]/G
1 OINTMENT OPHTHALMIC 2 TIMES DAILY
Qty: 3.5 G | Refills: 1 | Status: SHIPPED | OUTPATIENT
Start: 2017-06-28 | End: 2017-07-05

## 2017-06-28 RX ORDER — OXYMETAZOLINE HYDROCHLORIDE 0.05 G/100ML
2 SPRAY NASAL 2 TIMES DAILY
Status: DISCONTINUED | OUTPATIENT
Start: 2017-06-28 | End: 2017-06-28 | Stop reason: HOSPADM

## 2017-06-28 RX ORDER — DEXAMETHASONE SODIUM PHOSPHATE 4 MG/ML
INJECTION, SOLUTION INTRA-ARTICULAR; INTRALESIONAL; INTRAMUSCULAR; INTRAVENOUS; SOFT TISSUE PRN
Status: DISCONTINUED | OUTPATIENT
Start: 2017-06-28 | End: 2017-06-28

## 2017-06-28 RX ORDER — LIDOCAINE HYDROCHLORIDE 20 MG/ML
INJECTION, SOLUTION INFILTRATION; PERINEURAL PRN
Status: DISCONTINUED | OUTPATIENT
Start: 2017-06-28 | End: 2017-06-28

## 2017-06-28 RX ORDER — FENTANYL CITRATE 50 UG/ML
25-50 INJECTION, SOLUTION INTRAMUSCULAR; INTRAVENOUS
Status: DISCONTINUED | OUTPATIENT
Start: 2017-06-28 | End: 2017-06-28 | Stop reason: HOSPADM

## 2017-06-28 RX ORDER — EPHEDRINE SULFATE 50 MG/ML
INJECTION, SOLUTION INTRAMUSCULAR; INTRAVENOUS; SUBCUTANEOUS PRN
Status: DISCONTINUED | OUTPATIENT
Start: 2017-06-28 | End: 2017-06-28

## 2017-06-28 RX ORDER — DEXAMETHASONE SODIUM PHOSPHATE 10 MG/ML
10 INJECTION, SOLUTION INTRAMUSCULAR; INTRAVENOUS ONCE
Status: CANCELLED | OUTPATIENT
Start: 2017-06-28 | End: 2017-06-28

## 2017-06-28 RX ORDER — ONDANSETRON 4 MG/1
4 TABLET, ORALLY DISINTEGRATING ORAL EVERY 30 MIN PRN
Status: DISCONTINUED | OUTPATIENT
Start: 2017-06-28 | End: 2017-06-29 | Stop reason: HOSPADM

## 2017-06-28 RX ORDER — ONDANSETRON 2 MG/ML
4 INJECTION INTRAMUSCULAR; INTRAVENOUS EVERY 30 MIN PRN
Status: DISCONTINUED | OUTPATIENT
Start: 2017-06-28 | End: 2017-06-29 | Stop reason: HOSPADM

## 2017-06-28 RX ADMIN — Medication 100 MCG: at 10:25

## 2017-06-28 RX ADMIN — GLYCOPYRROLATE 0.2 MG: 0.2 INJECTION, SOLUTION INTRAMUSCULAR; INTRAVENOUS at 08:44

## 2017-06-28 RX ADMIN — DEXAMETHASONE SODIUM PHOSPHATE 4 MG: 4 INJECTION, SOLUTION INTRA-ARTICULAR; INTRALESIONAL; INTRAMUSCULAR; INTRAVENOUS; SOFT TISSUE at 09:32

## 2017-06-28 RX ADMIN — EPHEDRINE SULFATE 5 MG: 50 INJECTION, SOLUTION INTRAMUSCULAR; INTRAVENOUS; SUBCUTANEOUS at 10:25

## 2017-06-28 RX ADMIN — OXYMETAZOLINE HYDROCHLORIDE 2 SPRAY: 0.05 SPRAY NASAL at 08:44

## 2017-06-28 RX ADMIN — SODIUM CHLORIDE, SODIUM LACTATE, POTASSIUM CHLORIDE, CALCIUM CHLORIDE: 600; 310; 30; 20 INJECTION, SOLUTION INTRAVENOUS at 09:06

## 2017-06-28 RX ADMIN — Medication 200 MCG: at 09:50

## 2017-06-28 RX ADMIN — LIDOCAINE HYDROCHLORIDE 1 ML: 40 SOLUTION TOPICAL at 09:22

## 2017-06-28 RX ADMIN — Medication 100 MCG: at 09:39

## 2017-06-28 RX ADMIN — Medication 100 MCG: at 09:59

## 2017-06-28 RX ADMIN — EPHEDRINE SULFATE 5 MG: 50 INJECTION, SOLUTION INTRAMUSCULAR; INTRAVENOUS; SUBCUTANEOUS at 09:59

## 2017-06-28 RX ADMIN — LIDOCAINE HYDROCHLORIDE 80 MG: 20 INJECTION, SOLUTION INFILTRATION; PERINEURAL at 09:11

## 2017-06-28 RX ADMIN — PROPOFOL 200 MG: 10 INJECTION, EMULSION INTRAVENOUS at 09:31

## 2017-06-28 RX ADMIN — HYDROCODONE BITARTRATE AND ACETAMINOPHEN 1 TABLET: 5; 325 TABLET ORAL at 11:16

## 2017-06-28 RX ADMIN — FENTANYL CITRATE 50 MCG: 50 INJECTION, SOLUTION INTRAMUSCULAR; INTRAVENOUS at 09:32

## 2017-06-28 RX ADMIN — CEFAZOLIN SODIUM 3 G: 1 SOLUTION INTRAVENOUS at 09:06

## 2017-06-28 RX ADMIN — Medication 100 MCG: at 09:47

## 2017-06-28 RX ADMIN — SODIUM CHLORIDE, SODIUM LACTATE, POTASSIUM CHLORIDE, CALCIUM CHLORIDE: 600; 310; 30; 20 INJECTION, SOLUTION INTRAVENOUS at 10:35

## 2017-06-28 RX ADMIN — PROPOFOL 100 MG: 10 INJECTION, EMULSION INTRAVENOUS at 09:38

## 2017-06-28 RX ADMIN — OXYMETAZOLINE HYDROCHLORIDE 1 ML: 0.05 SPRAY NASAL at 09:22

## 2017-06-28 RX ADMIN — PROPOFOL 20 MG: 10 INJECTION, EMULSION INTRAVENOUS at 09:11

## 2017-06-28 RX ADMIN — PROPOFOL 50 MCG/KG/MIN: 10 INJECTION, EMULSION INTRAVENOUS at 09:11

## 2017-06-28 RX ADMIN — Medication 100 MCG: at 10:04

## 2017-06-28 RX ADMIN — ONDANSETRON 4 MG: 2 INJECTION INTRAMUSCULAR; INTRAVENOUS at 09:32

## 2017-06-28 RX ADMIN — EPHEDRINE SULFATE 5 MG: 50 INJECTION, SOLUTION INTRAMUSCULAR; INTRAVENOUS; SUBCUTANEOUS at 10:04

## 2017-06-28 NOTE — DISCHARGE INSTRUCTIONS
[facility]  Home Care Following Anesthesia  For 24 hours after surgery:  1. Get plenty of rest.  A responsible adult must stay with you for at least 24 hours after you leave the surgery center.  2. Do not drive or use heavy equipment.  If you have weakness or tingling, don't drive or use heavy equipment until this feeling goes away.   3. Do not drink alcohol.   4. Avoid strenuous or risky activities.  Ask for help when climbing stairs.  5. You may feel lightheaded.  IF so, sit for a few minutes before standing.  Have someone help you get up.   6. If you have nausea (feel sick to your stomach): Drink only clear liquids such as apple juice, ginger ale, broth or 7-Up.  Rest may also help.  Be sure to drink enough fluids.  Move to a regular diet as you feel able.   7. You may have a slight fever.  Call the doctor if your fever is over 100 F (37.7 C) (taken under the tongue) or lasts longer than 24 hours.  8. You may have a dry mouth, a sore throat, muscle aches or trouble sleeping. These should go away after 24 hours.  9. Do not make important or legal decisions.     Tips for taking pain medications  To get the best pain relief possible, remember these points:    Take pain medications as directed, before pain becomes severe.    Pain medication can upset your stomach: taking it with food may help.    Constipation is a common side effect of pain medication. Drink plenty of  fluids.    Eat foods high in fiber. Take a stool softener if recommended by your doctor or pharmacist.    Do not drink alcohol, drive or operate machinery while taking pain medications.    Ask about other ways to control pain, such as with heat, ice or relaxation.    Call a doctor for any of the followin. Signs of infection (fever, growing tenderness at the surgery site, a large amount of drainage or bleeding, severe pain, foul-smelling drainage, redness, swelling).  2. It has been over 8 to 10 hours since  surgery and you are still not able to urinate (pass water).  3. Headache for over 24 hours.  4. Numbness, tingling or weakness the day after surgery (if you had spinal anesthesia).  Your doctor is:  Dr. Rachael Valverde, ENT Otolaryngology: [phone]             Or dial [phone] and ask for the resident on call for:  ENT Otolaryngology  For emergency care, call the:  [facility]:  [phone] (TTY for hearing impaired: [phone])

## 2017-06-28 NOTE — IP AVS SNAPSHOT
MRN:[id number]                      After Visit Summary   6/28/2017    Eleazar Hummel    MRN: [id number]           Thank you!     Thank you for choosing [facility] for your care. Our goal is always to provide you with excellent care. Hearing back from our patients is one way we can continue to improve our services. Please take a few minutes to complete the written survey that you may receive in the mail after you visit with us. Thank you!        Patient Information     Date Of Birth          1960        About your hospital stay     You were admitted on:  June 28, 2017 You last received care in the:  [facility]    You were discharged on:  June 28, 2017       Who to Call     For medical emergencies, please call 911.  For non-urgent questions about your medical care, please call your primary care provider or clinic, None  For questions related to your surgery, please call your surgery clinic        Attending Provider     Provider Rachael García MD Otolaryngology       Primary Care Provider Fax #    [facility] [phone]      After Care Instructions     Diet Instructions       Resume pre procedure diet            Discharge Instructions - Lifting restrictions       Right arm lifting Restrictions 10 pounds for 2 weeks. Do not raise your right arm above your head for 4 weeks            Wound care       Keep dressing on for 3 days. You may take the dressing off after 3 days, then apply bacitracin ointment to wound twice daily. Do not submerge the wound under water.                  Further instructions from your care team       [facility]  Home Care Following Anesthesia  For 24 hours after surgery:  1. Get plenty of rest.  A responsible adult must stay with you for at least 24 hours after you leave the surgery center.  2. Do not drive or use heavy equipment.  If you have weakness or tingling,  don't drive or use heavy equipment until this feeling goes away.   3. Do not drink alcohol.   4. Avoid strenuous or risky activities.  Ask for help when climbing stairs.  5. You may feel lightheaded.  IF so, sit for a few minutes before standing.  Have someone help you get up.   6. If you have nausea (feel sick to your stomach): Drink only clear liquids such as apple juice, ginger ale, broth or 7-Up.  Rest may also help.  Be sure to drink enough fluids.  Move to a regular diet as you feel able.   7. You may have a slight fever.  Call the doctor if your fever is over 100 F (37.7 C) (taken under the tongue) or lasts longer than 24 hours.  8. You may have a dry mouth, a sore throat, muscle aches or trouble sleeping. These should go away after 24 hours.  9. Do not make important or legal decisions.     Tips for taking pain medications  To get the best pain relief possible, remember these points:    Take pain medications as directed, before pain becomes severe.    Pain medication can upset your stomach: taking it with food may help.    Constipation is a common side effect of pain medication. Drink plenty of  fluids.    Eat foods high in fiber. Take a stool softener if recommended by your doctor or pharmacist.    Do not drink alcohol, drive or operate machinery while taking pain medications.    Ask about other ways to control pain, such as with heat, ice or relaxation.    Call a doctor for any of the followin. Signs of infection (fever, growing tenderness at the surgery site, a large amount of drainage or bleeding, severe pain, foul-smelling drainage, redness, swelling).  2. It has been over 8 to 10 hours since surgery and you are still not able to urinate (pass water).  3. Headache for over 24 hours.  4. Numbness, tingling or weakness the day after surgery (if you had spinal anesthesia).  Your doctor is:  Dr. Rachael Valverde, ENT Otolaryngology: [phone]             Or dial [phone] and ask for the resident on  "call for:  ENT Otolaryngology  For emergency care, call the:  [facility]:  [phone] (TTY for hearing impaired: [phone])                          Pending Results     Date and Time Order Name Status Description    6/28/2017 1016 Surgical pathology exam In process             Admission Information     Date & Time Provider Department Dept. Phone    6/28/2017 Rachael Dover MD [facility] [phone]      Your Vitals Were     Blood Pressure Pulse Temperature Respirations Height Weight    114/71 71 97  F (36.1  C) (Temporal) 12 1.956 m (6' 5\") 127 kg (280 lb)    Pulse Oximetry BMI (Body Mass Index)                94% 33.2 kg/m2          "[employer] Information     "[employer] gives you secure access to your electronic health record. If you see a primary care provider, you can also send messages to your care team and make appointments. If you have questions, please call your primary care clinic.  If you do not have a primary care provider, please call [phone] and they will assist you.      "[employer] is an electronic gateway that provides easy, online access to your medical records. With "[employer] you can request a clinic appointment, read your test results, renew a prescription or communicate with your care team.     To access your existing account, please contact your [facility] or call [phone] for assistance.        Care EveryWhere ID     This is your Care EveryWhere ID. This could be used by other organizations to access your [facility] medical records  [id number]        Equal Access to Services     MARIA DEL ROSARIO RIOJAS AH: Hadii shelton ku hadasho Soomaali, waaxda luqadaha, qaybta kaalmada adeegyada, sachin headley . So [facility] [phone].    ATENCIÓN: Si habla español, tiene a rincon disposición servicios gratuitos de asistencia lingüística. Llame al [phone].    We comply with applicable federal civil rights " laws and [place] laws. We do not discriminate on the basis of race, color, national origin, age, disability sex, sexual orientation or gender identity.               Review of your medicines      START taking        Dose / Directions    bacitracin ophthalmic ointment   Used for:  Post-op pain        Dose:  1 Application   Apply 1 Application topically 2 times daily for 7 days Apply to chest wound   Quantity:  3.5 g   Refills:  1       HYDROcodone-acetaminophen 5-325 MG per tablet   Commonly known as:  NORCO   Used for:  Post-op pain        Dose:  1-2 tablet   Take 1-2 tablets by mouth every 4 hours as needed for other (Moderate to Severe Pain)   Quantity:  15 tablet   Refills:  0            Where to get your medicines      These medications were sent to [facility] [address] [place] [address] [address]  [address] [place] [address] [address] [place] [address]    Hours:  TRANSPLANT PHONE NUMBER [phone] Phone:  [phone]     bacitracin ophthalmic ointment         Some of these will need a paper prescription and others can be bought over the counter. Ask your nurse if you have questions.     Bring a paper prescription for each of these medications     HYDROcodone-acetaminophen 5-325 MG per tablet                Protect others around you: Learn how to safely use, store and throw away your medicines at www.disposemymeds.org.             Medication List: This is a list of all your medications and when to take them. Check marks below indicate your daily home schedule. Keep this list as a reference.      Medications           Morning Afternoon Evening Bedtime As Needed    bacitracin ophthalmic ointment   Apply 1 Application topically 2 times daily for 7 days Apply to chest wound                                HYDROcodone-acetaminophen 5-325 MG per tablet   Commonly known as:  NORCO   Take 1-2 tablets by mouth every 4 hours as needed for other (Moderate to Severe Pain)   Last time this was  given:  1 tablet on 6/28/2017 11:16 AM

## 2017-06-28 NOTE — OP NOTE
PREOPERATIVE DIAGNOSIS:   obstructive sleep apnea.        POSTOPERATIVE DIAGNOSIS:   obstructive sleep apnea.        PROCEDURE:  1. Drug-induced sleep endoscopy.           2. Removal of right hypoglossal nerve stimulator generator    SURGEON:  Rachael Dover MD        ANESTHESIA:  Monitored anesthesia care.        SPECIMENS REMOVED:  None.        COMPLICATIONS:  None.        INDICATIONS:  Patient is a 57 year old male with severe obstructive sleep apnea, who has difficulty tolerating CPAP therapy.  He was part of the Apnex trial, and is needs to have the implant removed due to end of life of the implant.  We performed DISE to determine if he would be a candidate for a different hypoglossal nerve stimulator implant.      FINDINGS:  V: % AP           O: 75-90% lateral           T : 50-75% AP, with some pharyngeal squeeze present            E: passive    Good response to jaw thrust at tongue base and velum.    Apnex generator removed and leads capped.          DESCRIPTION OF PROCEDURE:  The patient was brought into the operating room, placed on the operating table in supine position.  A member of the Department of Anesthesia was present and supplied the monitored anesthesia care.  A timeout was taken to correctly identify the patient and the procedure.  Once the patient reached an appropriate level of sedation, an endoscope was introduced into the patient's left nostril.  The upper airway was observed.  Findings are as above.  The scope was removed.      An LMA was then placed for a general anesthestic.  The table was turned 90 degrees.  The previous right chest incision was injected with 5ml of 2% lidocaine with epi.  The patient was prepped and draped in our normal sterile fashion.  A time out was taken.  An elliptical incision was fashioned and a 15 blade was used to make a skin incision.  We dissected bluntly through the subcutaneous fat until we reached the capsule.  The capsule was incised and the generator  was identified.  The generator was removed and the leads were cut.  The capsule was removed off the pectoralis muscle.  The pocket was irrigated with sterile normal saline.  The incision was closed in layers with 4-0 vicryl and 4-0 nylon.      The patient was handed back over to Anesthesia and transferred to the PACU in stable condition.

## 2017-06-28 NOTE — IP AVS SNAPSHOT
[facility]    [address] [place] [address]    Phone:  [phone]    Fax:  [phone]                                       After Visit Summary   6/28/2017    Eleazar Hummel    MRN: [id number]           After Visit Summary Signature Page     I have received my discharge instructions, and my questions have been answered. I have discussed any challenges I see with this plan with the nurse or doctor.    ..........................................................................................................................................  Patient/Patient Representative Signature      ..........................................................................................................................................  Patient Representative Print Name and Relationship to Patient    ..................................................               ................................................  Date                                            Time    ..........................................................................................................................................  Reviewed by Signature/Title    ...................................................              ..............................................  Date                                                            Time

## 2017-06-28 NOTE — PROGRESS NOTES
Patient meets all 4 criteria for Inspire.  Placed surgical orders for left hypoglossal nerve stimulator implant

## 2017-06-28 NOTE — BRIEF OP NOTE
[facility]    Brief Operative Note    Pre-operative diagnosis: End of Trial  Post-operative diagnosis Same  Procedure: Procedure(s):  Drug Induced Sleep Endoscopy, Removal of Apnex Implant, Right - Wound Class: I-Clean   - Wound Class: II-Clean Contaminated  Surgeon: Surgeon(s) and Role:     * Rachael Dover MD - Primary  Anesthesia: General   Estimated blood loss: 5 cc  Drains: None  Specimens:   ID Type Source Tests Collected by Time Destination   A : Right Hypoglossal Nerve Generator and partial leads Other (specify in comments) Chest SURGICAL PATHOLOGY EXAM Rachael Dover MD 6/28/2017 10:16 AM      Findings:   Apnex generator at left chest wall.  Complications: None.  Implants: None.

## 2017-06-28 NOTE — ANESTHESIA CARE TRANSFER NOTE
Patient: Eleazar Hummel    Procedure(s):  Drug Induced Sleep Endoscopy, Removal of Apnex Implant, Right - Wound Class: I-Clean   - Wound Class: II-Clean Contaminated    Diagnosis: End of Trial  Diagnosis Additional Information: No value filed.    Anesthesia Type:   MAC     Note:  Airway :Nasal Cannula  Patient transferred to:PACU  Comments: Patient awake and breathing spont. VSS. No complaints of pain or nausea. Report to RN      Vitals: (Last set prior to Anesthesia Care Transfer)    CRNA VITALS  6/28/2017 1023 - 6/28/2017 1101      6/28/2017             Pulse: 91    Ht Rate: 91    Temp: 35.7  C (96.3  F)    SpO2: 97 %    Resp Rate (observed): 16                Electronically Signed By: HOA Rivas CRNA  June 28, 2017  11:01 AM

## 2017-06-28 NOTE — ANESTHESIA POSTPROCEDURE EVALUATION
Patient: Eleazar Hummel    Procedure(s):  Drug Induced Sleep Endoscopy, Removal of Apnex Implant, Right - Wound Class: I-Clean   - Wound Class: II-Clean Contaminated    Diagnosis:End of Trial  Diagnosis Additional Information: No value filed.    Anesthesia Type:  MAC    Note:  Anesthesia Post Evaluation    Patient location during evaluation: PACU  Patient participation: Able to fully participate in evaluation  Level of consciousness: awake and alert  Pain management: adequate  Airway patency: patent  Cardiovascular status: acceptable  Respiratory status: acceptable  Hydration status: acceptable  PONV: none     Anesthetic complications: None          Last vitals:  Vitals:    06/28/17 1122 06/28/17 1135 06/28/17 1153   BP: 105/66 114/71 124/85   Pulse:      Resp: 12 12 12   Temp:  36.1  C (97  F) 36.1  C (97  F)   SpO2: 97% 94% 95%         Electronically Signed By: Kailee Falcon MD  June 28, 2017  11:59 AM

## 2017-07-06 LAB — COPATH REPORT: NORMAL

## 2017-07-11 ENCOUNTER — OFFICE VISIT (OUTPATIENT)
Dept: OTOLARYNGOLOGY | Facility: CLINIC | Age: 57
End: 2017-07-11

## 2017-07-11 DIAGNOSIS — G47.33 OSA (OBSTRUCTIVE SLEEP APNEA): Primary | ICD-10-CM

## 2017-07-11 ASSESSMENT — PAIN SCALES - GENERAL: PAINLEVEL: MILD PAIN (3)

## 2017-07-11 NOTE — NURSING NOTE
Chief Complaint   Patient presents with     RECHECK     Return sleep ENT post op DOS 6/28/2017 Removal of right hypoglossal nerve stimulator generator, BRIANNA per pt mychart req     Pt states chest pain by the incision of 3 today.      JAS Pearce LPN

## 2017-07-11 NOTE — PATIENT INSTRUCTIONS
1.  You were seen in the ENT Clinic today by Dr. Dover.  If you have any questions or concerns after your appointment, please call [phone].  Press option #1 for scheduling related needs.  Press option #3 for Nurse advice.  2.  Plan is to return to clinic on an as needed basis.  3. Okay to resume all activities at this point.  4. Will move forward with the Inspire prior authorization process. Molly's number is [phone].  5. Nurse coordinator is Rachael, [phone]        The Inspire implantation procedure is considered outpatient-pending no complications, you will leave the same day.    Incisions that you can expect.  Right upper neck-5 cm in length  Right lower neck, near your clavicle-5 cm in length  Right outer rib cage, midway down-5 cm in length      Recovery is usually in 2 phases.    Weeks 1-2   You will be sore mostly from the rib incision  No lifting your arm above shoulder level  No lifting greater than 15 lbs  Return to the clinic 7-10 days after the procedure for suture removal    Weeks 2-4  Modified restrictions, okay to increase activity as tolerated.  Okay to lift your arms above shoulder level  No lifting greater than 25 lbs.    Activation:This will usually take place one month after surgery. This will be done at the Sleep Center. All follow up appointments after he activation will also happen at the sleep center and they will arrange for those.

## 2017-07-11 NOTE — MR AVS SNAPSHOT
After Visit Summary   7/11/2017    Eleazar Hummel    MRN: [id number]           Patient Information     Date Of Birth          1960        Visit Information        Provider Department      7/11/2017 1:45 PM Rachael Dover MD [facility]        Care Instructions    1.  You were seen in the ENT Clinic today by Dr. Dover.  If you have any questions or concerns after your appointment, please call [phone].  Press option #1 for scheduling related needs.  Press option #3 for Nurse advice.  2.  Plan is to return to clinic on an as needed basis.  3. Okay to resume all activities at this point.  4. Will move forward with the Inspire prior authorization process. Molly's number is [phone].  5. Nurse coordinator is Rachael [phone]        The Inspire implantation procedure is considered outpatient-pending no complications, you will leave the same day.    Incisions that you can expect.  Right upper neck-5 cm in length  Right lower neck, near your clavicle-5 cm in length  Right outer rib cage, midway down-5 cm in length      Recovery is usually in 2 phases.    Weeks 1-2   You will be sore mostly from the rib incision  No lifting your arm above shoulder level  No lifting greater than 15 lbs  Return to the clinic 7-10 days after the procedure for suture removal    Weeks 2-4  Modified restrictions, okay to increase activity as tolerated.  Okay to lift your arms above shoulder level  No lifting greater than 25 lbs.    Activation:This will usually take place one month after surgery. This will be done at the Sleep Center. All follow up appointments after he activation will also happen at the sleep center and they will arrange for those.                    Follow-ups after your visit        Your next 10 appointments already scheduled     Jul 11, 2017  1:45 PM CDT   (Arrive by 1:30 PM)   RETURN SLEEP ENT with MD SARITA [facility] (  [facility] [place])    [address]  [address] [facility] [address] [phone]              Who to contact     Please call your clinic at [phone] to:    Ask questions about your health    Make or cancel appointments    Discuss your medicines    Learn about your test results    Speak to your doctor   If you have compliments or concerns about an experience at your clinic, or if you wish to file a complaint, please contact [facility] Patient Relations at [phone] or email us at [email or web address]         Additional Information About Your Visit        [employer] Information     [employer] gives you secure access to your electronic health record. If you see a primary care provider, you can also send messages to your care team and make appointments. If you have questions, please call your primary care clinic.  If you do not have a primary care provider, please call [phone] and they will assist you.      [employer]! is an electronic gateway that provides easy, online access to your medical records. With [employer] you can request a clinic appointment, read your test results, renew a prescription or communicate with your care team.     To access your existing account, please contact your [facility] Clinic or call [phone] for assistance.        Care EveryWhere ID     This is your Care EveryWhere ID. This could be used by other organizations to access your [facility] medical records  [id number]         Blood Pressure from Last 3 Encounters:   06/28/17 124/85   06/15/17 129/80   10/23/12 112/72    Weight from Last 3 Encounters:   06/28/17 127 kg (280 lb)   06/15/17 128.3 kg (282 lb 14.4 oz)   06/15/17 126.6 kg (279 lb)              Today, you had the following     No orders found for display       Primary Care Provider Fax #    [facility] [phone]       [facility] [address]  .  [place] [address]        Equal Access to Services     [facility]: Hadii shelton vazquez dontesam Elissa, waaxda luqadaha, qaybta kaalmada yvonne, sachin hou. So [facility] [place] [phone].    ATENCIÓN: Si habla español, tiene a rincon disposición servicios gratuitos de asistencia lingüística. Llame al [phone].    We comply with applicable federal civil rights laws and [place] laws. We do not discriminate on the basis of race, color, national origin, age, disability sex, sexual orientation or gender identity.            Thank you!     Thank you for choosing [facility]  for your care. Our goal is always to provide you with excellent care. Hearing back from our patients is one way we can continue to improve our services. Please take a few minutes to complete the written survey that you may receive in the mail after your visit with us. Thank you!             Your Updated Medication List - Protect others around you: Learn how to safely use, store and throw away your medicines at www.disposemymeds.org.          This list is accurate as of: 7/11/17  1:43 PM.  Always use your most recent med list.                   Brand Name Dispense Instructions for use Diagnosis    ADVIL PO

## 2017-07-11 NOTE — PROGRESS NOTES
CC: s/p DISE and removal of Apnex generator 6/28/2017    HPI:  The patient returns for his postoperative visit after removal of a right sided Apnex generator.  He reports no issue with recovery. He does find these sutures to be a little irritative but no other issues.    PE:  GEN: nad  CHEST: Incision is clean dry and intact. Sutures were removed. No sign of infection. No sign of hematoma or seroma.    A/P:  Status post removal of her right-sided hypoglossal nerve stimulator implant. Patient is well-healed. He is cleared to return to normal activity. We have already started the process for prior authorization for an Inspire implant.

## 2017-07-11 NOTE — LETTER
7/11/2017       RE: Eleazar Hummel  [address] [address]     Dear Colleague,    Thank you for referring your patient, Eleazar Hummel, to the [facility] E[facility] TH[facility] at [facility]. Please see a copy of my visit note below.    CC: s/p DISE and removal of Apnex generator 6/28/2017    HPI:  The patient returns for his postoperative visit after removal of a right sided Apnex generator.  He reports no issue with recovery. He does find these sutures to be a little irritative but no other issues.    PE:  GEN: nad  CHEST: Incision is clean dry and intact. Sutures were removed. No sign of infection. No sign of hematoma or seroma.    A/P:  Status post removal of her right-sided hypoglossal nerve stimulator implant. Patient is well-healed. He is cleared to return to normal activity. We have already started the process for prior authorization for an Inspire implant.      Again, thank you for allowing me to participate in the care of your patient.      Sincerely,    Rachael Dover MD

## 2017-07-26 NOTE — OP NOTE
PREOPERATIVE DIAGNOSIS:  1.  Obstructive sleep apnea.  2.  End of life of hypoglossal nerve stimulator implant.    POSTOPERATIVE DIAGNOSIS:  1.  Obstructive sleep apnea.  2.  End of life of hypoglossal nerve stimulator implant.    OPERATIVE PROCEDURE:  1.  Drug induced sleep endoscopy.  2.  Removal of a right hypoglossal nerve stimulator generator.    SURGEON:  Dr. Rachael Dover.    ASSISSTANTS:  Dr. Gerber residents.    ANETHESIA:  MAC follow by general.    ESTIMATED BLOOD LOSS:  Minimal.      SPECIMENS:  Right hypoglossal nerve stimulator generator.    COMPLICATIONS:  None.    INDICATIONS:  The patient is a 57-year-old male with a history of severe obstructive sleep apnea.  He was part of the apneic surgical trial.  The implant has reached its end of life and requires removal.  We also performed a drug induced sleep endoscopy to determine upper airway anatomy for possibly candidacy of a new hypoglossal nerve stimulator system.      FINDINGS:  Velopharaynx 100% anterior posterior collapse, minimal lateral wall encroachment.  Oropharynx 50-75% lateral collapse, stable tongue base, 50-75% AP collapse with some pharyngeal wall squeeze, epiglottis is passive.  Good response to jaw thrust at the level of the hypopharynx and tongue base and velum    We located the apnex generator and removed it without difficulty.  The stimulation leads and the sensing leads were capped, but left in place.      DESCRIPTION:  Patient was brought into the operating room, placed on the operating table, supine positon.  A member of the department of anesthesia was present and supplied the Propofol.  A time out was taken to correctly identify the patient and the procedure.  Propofol drip was started.  Once the patient reached an appropriate level of sedation a flexible endoscope was introduced into the right nasal cavity and used to observe the airway.  Findings are as above.  The scope was removed.  Patient was deepened and LMA was placed  for a general anesthetic.  The table was turned 90 degrees.  Approximately 5 mL of 1% Lidocaine with Epinephrine was injected into the chest.  We prepped and draped the patient in our normal sterile position.  A 15 blade was then used to make an elliptical incision around the patient's previous chest incision.  She bluntly dissected through subcutaneous fat.  We then identified the capsule surrounding the generator.  The capsule was dissected away from the generator.  We cut the stay suture and the generator was removed.  The leads were identified, cut, and capped.  We removed some additional remaining capsule to expose fresh pectoralis muscle.  Hemostasis was achieved.  The packet was irrigated with sterile normal saline.  We then closed the incision in layers.  We used 4-0 Vircyl to re-approximate the subcutaneous tissue to the pectoralis muscle.  Deep sutures were then placed using 4-0 Vircyl.  The skin was then closed using 4-0 Nylon in a simple interrupted fashion.  Patient was then allowed to extubate and transferred to the PACU in stable condition.  Please note that I was present for the entire procedure.

## 2018-01-03 ENCOUNTER — TELEPHONE (OUTPATIENT)
Dept: OTOLARYNGOLOGY | Facility: CLINIC | Age: 58
End: 2018-01-03

## 2018-01-03 NOTE — TELEPHONE ENCOUNTER
1/2/18  PA approval received from Molly for surgery - INSPIRE    1/3/18 spoke with patient and scheduled surgery    2/14/18    Left hypoglossal nerve stimulator implant scheduled at the [facility] with Dr Dover    *Pt was offered 1/31/18 but date did not work for pt    Surgery packet mailed 1/3/18    Charlee Espinosa RN Care Coordinator notified of date     PAC Phone call scheduled for 2/12/18  11:00 am  Post Op scheduled for 2/22/18  9:45 am with Dr Dover

## 2018-02-09 ENCOUNTER — CARE COORDINATION (OUTPATIENT)
Dept: OTOLARYNGOLOGY | Facility: CLINIC | Age: 58
End: 2018-02-09

## 2018-02-09 NOTE — PROGRESS NOTES
The patient is scheduled for Inspire Implantation on 2/14/18 with Dr. Dover. Call was placed to do pre op teaching. VM was left. Will wait for a call back to discuss further.    Rachael Gee R.N., B.S.N.  Nurse Coordinator Head & Neck Surgery  [phone]  2/9/2018 11:27 AM

## 2018-02-13 ENCOUNTER — ANESTHESIA EVENT (OUTPATIENT)
Dept: SURGERY | Facility: AMBULATORY SURGERY CENTER | Age: 58
End: 2018-02-13

## 2018-02-13 NOTE — ANESTHESIA PREPROCEDURE EVALUATION
Anesthesia Evaluation     .             ROS/MED HX    ENT/Pulmonary:     (+)sleep apnea, , . .    Neurologic:       Cardiovascular:         METS/Exercise Tolerance:     Hematologic:         Musculoskeletal:         GI/Hepatic:         Renal/Genitourinary:         Endo:         Psychiatric:         Infectious Disease:         Malignancy:         Other:                                    Anesthesia Plan      History & Physical Review  History and physical reviewed and following examination; no interval change.    ASA Status:  2 .        Plan for General and ETT with Intravenous induction. Maintenance will be TIVA.    PONV prophylaxis:  Ondansetron (or other 5HT-3) and Dexamethasone or Solumedrol  Additional equipment: Videolaryngoscope Warmed Nasal RYDER, Afrin, lubricated nasal trumpets, [facility] forceps, video laryngoscope      Postoperative Care  Postoperative pain management:  Oral pain medications and Multi-modal analgesia.      Consents  Anesthetic plan, risks, benefits and alternatives discussed with:  Patient..                          .

## 2018-02-14 ENCOUNTER — ANESTHESIA (OUTPATIENT)
Dept: SURGERY | Facility: AMBULATORY SURGERY CENTER | Age: 58
End: 2018-02-14

## 2018-02-14 ENCOUNTER — SURGERY (OUTPATIENT)
Age: 58
End: 2018-02-14

## 2018-02-14 ENCOUNTER — HOSPITAL ENCOUNTER (OUTPATIENT)
Facility: AMBULATORY SURGERY CENTER | Age: 58
End: 2018-02-14
Attending: OTOLARYNGOLOGY
Payer: COMMERCIAL

## 2018-02-14 VITALS
BODY MASS INDEX: 33.06 KG/M2 | WEIGHT: 280 LBS | SYSTOLIC BLOOD PRESSURE: 120 MMHG | TEMPERATURE: 98 F | HEIGHT: 77 IN | DIASTOLIC BLOOD PRESSURE: 79 MMHG | OXYGEN SATURATION: 92 % | RESPIRATION RATE: 16 BRPM

## 2018-02-14 DIAGNOSIS — G89.18 POST-OP PAIN: Primary | ICD-10-CM

## 2018-02-14 DEVICE — LEAD SENSING INSPIRE RESPIRATORY 4323-45: Type: IMPLANTABLE DEVICE | Site: CHEST  WALL | Status: FUNCTIONAL

## 2018-02-14 DEVICE — LEAD STIMULATION INSPIRE 4063-45: Type: IMPLANTABLE DEVICE | Site: NECK | Status: FUNCTIONAL

## 2018-02-14 RX ORDER — KETOROLAC TROMETHAMINE 30 MG/ML
INJECTION, SOLUTION INTRAMUSCULAR; INTRAVENOUS PRN
Status: DISCONTINUED | OUTPATIENT
Start: 2018-02-14 | End: 2018-02-14

## 2018-02-14 RX ORDER — DEXAMETHASONE SODIUM PHOSPHATE 10 MG/ML
INJECTION, SOLUTION INTRAMUSCULAR; INTRAVENOUS PRN
Status: DISCONTINUED | OUTPATIENT
Start: 2018-02-14 | End: 2018-02-14

## 2018-02-14 RX ORDER — PROPOFOL 10 MG/ML
INJECTION, EMULSION INTRAVENOUS PRN
Status: DISCONTINUED | OUTPATIENT
Start: 2018-02-14 | End: 2018-02-14

## 2018-02-14 RX ORDER — SODIUM CHLORIDE, SODIUM LACTATE, POTASSIUM CHLORIDE, CALCIUM CHLORIDE 600; 310; 30; 20 MG/100ML; MG/100ML; MG/100ML; MG/100ML
INJECTION, SOLUTION INTRAVENOUS CONTINUOUS
Status: DISCONTINUED | OUTPATIENT
Start: 2018-02-14 | End: 2018-02-14 | Stop reason: HOSPADM

## 2018-02-14 RX ORDER — FENTANYL CITRATE 50 UG/ML
INJECTION, SOLUTION INTRAMUSCULAR; INTRAVENOUS PRN
Status: DISCONTINUED | OUTPATIENT
Start: 2018-02-14 | End: 2018-02-14

## 2018-02-14 RX ORDER — LIDOCAINE HYDROCHLORIDE AND EPINEPHRINE 10; 10 MG/ML; UG/ML
INJECTION, SOLUTION INFILTRATION; PERINEURAL PRN
Status: DISCONTINUED | OUTPATIENT
Start: 2018-02-14 | End: 2018-02-14 | Stop reason: HOSPADM

## 2018-02-14 RX ORDER — CEFAZOLIN SODIUM 1 G/50ML
3 SOLUTION INTRAVENOUS
Status: CANCELLED | OUTPATIENT
Start: 2018-02-14

## 2018-02-14 RX ORDER — LIDOCAINE HYDROCHLORIDE 20 MG/ML
INJECTION, SOLUTION INFILTRATION; PERINEURAL PRN
Status: DISCONTINUED | OUTPATIENT
Start: 2018-02-14 | End: 2018-02-14

## 2018-02-14 RX ORDER — GABAPENTIN 300 MG/1
300 CAPSULE ORAL ONCE
Status: COMPLETED | OUTPATIENT
Start: 2018-02-14 | End: 2018-02-14

## 2018-02-14 RX ORDER — ACETAMINOPHEN 325 MG/1
975 TABLET ORAL ONCE
Status: COMPLETED | OUTPATIENT
Start: 2018-02-14 | End: 2018-02-14

## 2018-02-14 RX ORDER — EPINEPHRINE 0.1 MG/ML
SYRINGE (ML) INJECTION PRN
Status: DISCONTINUED | OUTPATIENT
Start: 2018-02-14 | End: 2018-02-14 | Stop reason: HOSPADM

## 2018-02-14 RX ORDER — ONDANSETRON 4 MG/1
4 TABLET, ORALLY DISINTEGRATING ORAL EVERY 8 HOURS PRN
Qty: 9 TABLET | Refills: 0 | Status: SHIPPED | OUTPATIENT
Start: 2018-02-14 | End: 2018-03-21

## 2018-02-14 RX ORDER — CEPHALEXIN 500 MG/1
500 CAPSULE ORAL 3 TIMES DAILY
Qty: 21 CAPSULE | Refills: 0 | Status: SHIPPED | OUTPATIENT
Start: 2018-02-14 | End: 2018-02-21

## 2018-02-14 RX ORDER — EPHEDRINE SULFATE 50 MG/ML
INJECTION, SOLUTION INTRAMUSCULAR; INTRAVENOUS; SUBCUTANEOUS PRN
Status: DISCONTINUED | OUTPATIENT
Start: 2018-02-14 | End: 2018-02-14

## 2018-02-14 RX ORDER — HYDROCODONE BITARTRATE AND ACETAMINOPHEN 7.5; 325 MG/1; MG/1
1-2 TABLET ORAL EVERY 6 HOURS PRN
Qty: 50 TABLET | Refills: 0 | Status: SHIPPED | OUTPATIENT
Start: 2018-02-14 | End: 2018-03-21

## 2018-02-14 RX ORDER — PROPOFOL 10 MG/ML
INJECTION, EMULSION INTRAVENOUS CONTINUOUS PRN
Status: DISCONTINUED | OUTPATIENT
Start: 2018-02-14 | End: 2018-02-14

## 2018-02-14 RX ORDER — ONDANSETRON 2 MG/ML
INJECTION INTRAMUSCULAR; INTRAVENOUS PRN
Status: DISCONTINUED | OUTPATIENT
Start: 2018-02-14 | End: 2018-02-14

## 2018-02-14 RX ORDER — CEFAZOLIN SODIUM 1 G/50ML
3 SOLUTION INTRAVENOUS
Status: DISCONTINUED | OUTPATIENT
Start: 2018-02-14 | End: 2018-02-14 | Stop reason: HOSPADM

## 2018-02-14 RX ADMIN — FENTANYL CITRATE 100 MCG: 50 INJECTION, SOLUTION INTRAMUSCULAR; INTRAVENOUS at 07:28

## 2018-02-14 RX ADMIN — DEXAMETHASONE SODIUM PHOSPHATE 10 MG: 10 INJECTION, SOLUTION INTRAMUSCULAR; INTRAVENOUS at 07:28

## 2018-02-14 RX ADMIN — Medication 100 MCG: at 07:55

## 2018-02-14 RX ADMIN — LIDOCAINE HYDROCHLORIDE 100 MG: 20 INJECTION, SOLUTION INFILTRATION; PERINEURAL at 07:28

## 2018-02-14 RX ADMIN — Medication 100 MCG: at 08:18

## 2018-02-14 RX ADMIN — Medication 1 ML: at 10:31

## 2018-02-14 RX ADMIN — Medication 0.5 MG: at 09:18

## 2018-02-14 RX ADMIN — ONDANSETRON 4 MG: 2 INJECTION INTRAMUSCULAR; INTRAVENOUS at 07:28

## 2018-02-14 RX ADMIN — LIDOCAINE HYDROCHLORIDE AND EPINEPHRINE 8 ML: 10; 10 INJECTION, SOLUTION INFILTRATION; PERINEURAL at 10:32

## 2018-02-14 RX ADMIN — PROPOFOL: 10 INJECTION, EMULSION INTRAVENOUS at 09:59

## 2018-02-14 RX ADMIN — EPHEDRINE SULFATE 5 MG: 50 INJECTION, SOLUTION INTRAMUSCULAR; INTRAVENOUS; SUBCUTANEOUS at 08:29

## 2018-02-14 RX ADMIN — PROPOFOL 200 MCG/KG/MIN: 10 INJECTION, EMULSION INTRAVENOUS at 07:32

## 2018-02-14 RX ADMIN — GABAPENTIN 300 MG: 300 CAPSULE ORAL at 06:23

## 2018-02-14 RX ADMIN — PROPOFOL 100 MCG/KG/MIN: 10 INJECTION, EMULSION INTRAVENOUS at 07:53

## 2018-02-14 RX ADMIN — PROPOFOL 200 MG: 10 INJECTION, EMULSION INTRAVENOUS at 07:28

## 2018-02-14 RX ADMIN — SODIUM CHLORIDE, SODIUM LACTATE, POTASSIUM CHLORIDE, CALCIUM CHLORIDE: 600; 310; 30; 20 INJECTION, SOLUTION INTRAVENOUS at 06:42

## 2018-02-14 RX ADMIN — PROPOFOL: 10 INJECTION, EMULSION INTRAVENOUS at 09:42

## 2018-02-14 RX ADMIN — KETOROLAC TROMETHAMINE 30 MG: 30 INJECTION, SOLUTION INTRAMUSCULAR; INTRAVENOUS at 10:19

## 2018-02-14 RX ADMIN — EPHEDRINE SULFATE 10 MG: 50 INJECTION, SOLUTION INTRAMUSCULAR; INTRAVENOUS; SUBCUTANEOUS at 08:01

## 2018-02-14 RX ADMIN — LIDOCAINE HYDROCHLORIDE AND EPINEPHRINE 10 ML: 10; 10 INJECTION, SOLUTION INFILTRATION; PERINEURAL at 08:52

## 2018-02-14 RX ADMIN — Medication 200 MCG: at 07:41

## 2018-02-14 RX ADMIN — Medication 200 MCG: at 08:00

## 2018-02-14 RX ADMIN — Medication 200 MCG: at 07:50

## 2018-02-14 RX ADMIN — ACETAMINOPHEN 975 MG: 325 TABLET ORAL at 06:23

## 2018-02-14 NOTE — IP AVS SNAPSHOT
[facility]    [address] [place] [address]    Phone:  [phone]    Fax:  [phone]                                       After Visit Summary   2/14/2018    Eleazar Hummel    MRN: [id number]           After Visit Summary Signature Page     I have received my discharge instructions, and my questions have been answered. I have discussed any challenges I see with this plan with the nurse or doctor.    ..........................................................................................................................................  Patient/Patient Representative Signature      ..........................................................................................................................................  Patient Representative Print Name and Relationship to Patient    ..................................................               ................................................  Date                                            Time    ..........................................................................................................................................  Reviewed by Signature/Title    ...................................................              ..............................................  Date                                                            Time

## 2018-02-14 NOTE — ANESTHESIA CARE TRANSFER NOTE
Patient: Eleazar Hummel    Procedure(s):  Left Hypoglossal Nerve Stimulator Implant - Wound Class: I-Clean    Diagnosis: Obstructive Sleep Apnea  Diagnosis Additional Information: No value filed.    Anesthesia Type:   General, ETT     Note:  Airway :Face Mask  Patient transferred to:PACU  Comments: To PACU pt. Alert O2 via face mask @ 8 liters. Report given to RNHandoff Report: Identifed the Patient, Identified the Reponsible Provider, Reviewed the pertinent medical history, Discussed the surgical course, Reviewed Intra-OP anesthesia mangement and issues during anesthesia, Set expectations for post-procedure period and Allowed opportunity for questions and acknowledgement of understanding      Vitals: (Last set prior to Anesthesia Care Transfer)    CRNA VITALS  2/14/2018 1042 - 2/14/2018 1116      2/14/2018             Resp Rate (observed): 8    Resp Rate (set): 10                Electronically Signed By: HOA Abrams CRNA  February 14, 2018  11:16 AM

## 2018-02-14 NOTE — DISCHARGE INSTRUCTIONS
Inspire  Post-Operative Instructions      DIET:      Resume normal diet or soft diet if you have a sore throat.    HYGIENE:      Please wait until 48 hours after surgery before getting incisions on neck, chest, and torso wet.    In the first 48 hours after surgery, will likely need to take sponge baths.    WOUND CARE:      Please leave pressure dressing on for 48 hours after surgery.    Gently place antibiotic ointment over incisions 2-3 times per day; use clean q-tip.    May place a clean bandage over incisions as needed.    After 48 hours, you may get incisions wet with warm soap and water, but do not soak the incisions. Pat area dry gently. Immediately place antibiotic ointment.    Take oral antibiotics as prescribed.    If skin around incision starts to get red (>1cm), swollen, and/or more painful, please call clinic and [phone], option 3.    ACTIVITY:      For the first 72 hours after surgery, please restrict arm movements to slow, small motions, as much as possible. Wearing the arm sling is advisable.    For 2 weeks, avoid any activities that require arm/shoulder motion greater than 90 degrees (arm must stay below shoulder level).    Do not  anything greater than 5 pounds with right hand/arm for 10 days. After 10 days, you may increase weight to 10 pounds.    You may resume light activities of daily living after 10 days.    Avoid any activity that requires extreme twisting of the neck.    No strenuous activity (running, jogging, lifting weights, gardening, sports) for one month or until cleared by physician.    You may walk for exercise starting the day after surgery.    Try to avoid sleeping on your right side, to the extent possible.    PAIN MEDICATIONS:      You will be prescribed Vicodin for pain.    If pain is not severe, consider taking Tylenol 650 mg every 6 hours.    Avoid ibuprofen or aspirin for 7 days after surgery.    Avoid direct heat (such as heating pads) to incision sites.    May  "gently place ice over surgery sites as needed. Please place a thin clean towel over skin first and then place ice bag over towel. Ice for 10 minutes at a time only.          POST-OPERATIVE CLINIC APPOINTMENTS:      1 week: suture removal and wound check at Dr. Dover's ENT clinic ([facility], 4th floor, [facility]; [phone], option 3)    1 month: device activation and wound check at Sleep Clinic ([facility] with Dr. Abdi Lake; [phone])    2 months: device titration sleep study at Sleep Lab ([facility]; [phone])    4 months: final wound check at Dr. Dover's ENT clinic    Yearly: device checks at sleep clinic    SCAR CARE:      After incisions have healed, you will have a scar, which will continue to evolve over the course of 12 months. Caring for your incision scars will help them to be as minimal as possible.    If you are out in the sun with incision exposure, please remember to place sunscreen over the incision and surrounding skin.    You may use vitamin E or \"Scar ointment/cream\" to help soften scar. Please wait one month after surgery before starting this.            [facility]  Home Care Following Anesthesia  For 24 hours after surgery:  1. Get plenty of rest.  A responsible adult must stay with you for at least 24 hours after you leave the surgery center.  2. Do not drive or use heavy equipment.  If you have weakness or tingling, don't drive or use heavy equipment until this feeling goes away.   3. Do not drink alcohol.   4. Avoid strenuous or risky activities.  Ask for help when climbing stairs.  5. You may feel lightheaded.  IF so, sit for a few minutes before standing.  Have someone help you get up.   6. If you have nausea (feel sick to your stomach): Drink only clear liquids such as apple juice, ginger ale, broth or 7-Up.  Rest may also help.  Be sure to drink " "enough fluids.  Move to a regular diet as you feel able.   7. You may have a slight fever.  Call the doctor if your fever is over 100 F (37.7 C) (taken under the tongue) or lasts longer than 24 hours.  8. You may have a dry mouth, a sore throat, muscle aches or trouble sleeping. These should go away after 24 hours.  9. Do not make important or legal decisions.        Today you received a Marcaine or bupivacaine block to numb the nerves near your surgery site.  This is a block using local anesthetic or \"numbing\" medication injected around the nerves to anesthetize or \"numb\" the area supplied by those nerves.  This block is injected into the muscle layer near your surgical site.  The medication may numb the location where you had surgery for 6-18 hours, but may last up to 24 hours.  If your surgical site is an arm or leg you should be careful with your affected limb, since it is possible to injure your limb without being aware of it due to the numbing.  Until full feeling returns, you should guard against bumping or hitting your limb, and avoid extreme hot or cold temperatures on the skin.  As the block wears off, the feeling will return as a tingling or prickly sensation near your surgical site.  You will experience more discomfort from your incision as the feeling returns.  You may want to take a pain pill (a narcotic or Tylenol if this was prescribed by your surgeon) when you start to experience mild pain before the pain beccomes more severe.  If your pain medications do not control your pain you should notifiy your surgeon.    Tips for taking pain medications  To get the best pain relief possible, remember these points:    Take pain medications as directed, before pain becomes severe.    Pain medication can upset your stomach: taking it with food may help.    Constipation is a common side effect of pain medication. Drink plenty of  fluids.    Eat foods high in fiber. Take a stool softener if recommended by your " doctor or pharmacist.    Do not drink alcohol, drive or operate machinery while taking pain medications.    Ask about other ways to control pain, such as with heat, ice or relaxation.    Tylenol/Acetaminophen Consumption  To help encourage the safe use of acetaminophen, the makers of TYLENOL  have lowered the maximum daily dose for single-ingredient Extra Strength TYLENOL  (acetaminophen) products sold in the [place] from 8 pills per day (4,000 mg) to 6 pills per day (3,000 mg). The dosing interval has also changed from 2 pills every 4-6 hours to 2 pills every 6 hours.    If you feel your pain relief is insufficient, you may take Tylenol/Acetaminophen in addition to your narcotic pain medication.     Be careful not to exceed 3,000 mg of Tylenol/Acetaminophen in a 24 hour period from all sources.    If you are taking extra strength Tylenol/acetaminophen (500 mg), the maximum dose is 6 tablets in 24 hours.    If you are taking regular strength acetaminophen (325 mg), the maximum dose is 9 tablets in 24 hours.    Call a doctor for any of the followin. Signs of infection (fever, growing tenderness at the surgery site, a large amount of drainage or bleeding, severe pain, foul-smelling drainage, redness, swelling).  2. It has been over 8 to 10 hours since surgery and you are still not able to urinate (pass water).  3. Headache for over 24 hours.  4. Numbness, tingling or weakness the day after surgery (if you had spinal anesthesia).  Your doctor is:  Dr. Rachael Dover, ENT Otolaryngology: [phone]  Or dial [phone] and ask for the resident on call for:  ENT Otolaryngology  For emergency care, call the:  [facility]:  [phone] (TTY for hearing impaired: [phone])

## 2018-02-14 NOTE — ANESTHESIA POSTPROCEDURE EVALUATION
Patient: Eleazar Hummel    Procedure(s):  Left Hypoglossal Nerve Stimulator Implant - Wound Class: I-Clean    Diagnosis:Obstructive Sleep Apnea  Diagnosis Additional Information: No value filed.    Anesthesia Type:  General, ETT    Note:  Anesthesia Post Evaluation    Patient location during evaluation: PACU  Patient participation: Able to fully participate in evaluation  Level of consciousness: awake and alert  Pain management: adequate  Airway patency: patent  Cardiovascular status: hemodynamically stable  Respiratory status: nonlabored ventilation, spontaneous ventilation and room air  Hydration status: euvolemic  PONV: none     Anesthetic complications: None          Last vitals:  Vitals:    02/14/18 1145 02/14/18 1203 02/14/18 1227   BP: 115/70 117/72 120/79   Resp: 16 16 16   Temp:  36.7  C (98  F) 36.7  C (98  F)   SpO2: 95% 97% 92%         Electronically Signed By: Dave Morales MD  February 14, 2018  12:34 PM

## 2018-02-14 NOTE — OP NOTE
PREOPERATIVE DIAGNOSIS:  Severe obstructive sleep apnea, intolerant to positive airway pressure therapy.        POSTOPERATIVE DIAGNOSIS:  Severe obstructive sleep apnea, intolerant to positive airway pressure therapy.        PROCEDURES:    1.  Twelfth cranial nerve/hypoglossal stimulation implant along with placement of a left pleural respiration sensor.    2.  Electronic analysis of implanted neurostimulator and pulse generator system.        SURGEON:  Rachael Dover MD        ASSISTANTS:  Bren Rasmussen MD; Pavel Jorge MD      ANESTHESIA:  General endotracheal.        SPECIMENS REMOVED:  None.        COMPLICATIONS:  None.        ESTIMATED BLOOD LOSS:  50        INDICATIONS:  Patient is a 57 year old male with a history of severe obstructive sleep apnea.  Patient has been intolerant to positive airway pressure therapy and has not been able to achieve consistent benefit from medical management.  They clinical polysomnographic and endoscopic screening criteria for hypoglossal nerve stimulation implant.  He previously had the Apnex hypoglossal implant on the right side; so we placed the Inspire implant on the left side.      FINDINGS:  The patient had a normal 12th cranial nerve.  The C1 branch was incorporated.  The implanted pulse generator was placed in the left chest wall.  The sensing lead was placed at approximately the fifth intercostal space.        DESCRIPTION OF PROCEDURE:  The patient was brought into the operating room, placed on the operating table in supine position.  A member of the Department of Anesthesia was present and intubated the patient via nasal intubation.  The tube was secured and the table was turned 90 degrees.  A shoulder roll and the right chest bump placed for positioning.  The arms were protected with padding.  The incisions for the neck, chest wall and IPG incisions were marked preprocedurely.  The neck and chest wall incisions were injected with approximately 5 mL of 1% lidocaine  with epinephrine.  Monitoring electrodes were placed in the genioglossus and hyoglossal muscle of the tongue.  The electrodes were then connected to the NIM box for intraoperative nerve monitoring.  The patient was then prepped and draped in our normal sterile fashion with the head turned to the right.  A timeout was taken to correctly identify the patient and the procedure.  A modified submandibular gland incision was made in the left upper neck approximately 1 fingerbreadth below the mandible and in a natural skin crease.  Dissection was then carried down through the subcutaneous tissues and the platysma muscles were divided.  We eventually encountered the anterior border of the submandibular gland.  The inferior and anterior border of the gland were then dissected free from the underlying tissues.  This allowed the submandibular gland to be retracted posterior superiorly.  We were then able to find the tendon of the digastric muscle, which was directly underneath the submandibular gland.  The tendon and the muscle were dissected free and retracted inferiorly.  The posterior border of the anterior belly of the digastric was skeletonized.  We then were able to visualize the mylohyoid muscle.  Dissection was carried down into the digastric triangle where the hypoglossal muscle was identified.  A large vein was identified  next to the nerve and this was ligated.  The mylohyoid muscle was then retracted anteriorly.  The intraoperative microscope was then brought into the field.  The hypoglossal nerve was then dissected towards the floor of the mouth.  The individual branches of the 12th cranial nerve were then dissected under the microscope.  The anterior border of the hypoglossal muscle was identified.  The lateral branches of the retrusor muscles were identified and tested intraoperatively using the NIM monitor.  The lateral branches were excluded.  The electrode for the hypoglossal nerve stimulator was placed  distally, incorporating C1 and the protrusor branches only.  We then secured using the provided suture anchors.        A 4 cm incision was then made in the left upper chest.  Dissection was carried down to the pectoralis muscle.  The inferior pocket was created deep to the subcutaneous layer and superficial to the pectoralis muscle.  A third incision was made in the axillary line just adjacent to the nipple.  Dissection was carried down through the skin and subcutaneous tissue.  The serratus muscle was identified and incised.  Self-retaining retractors were used to expose the pocket.  The external intercostal muscles were then identified.  A tunnel was created between the external and internal intercostal muscles.  The pleural respiration sensing lead was placed into the pocket.  The sensor was then sutured to the fascia using the provided suture anchors.  The pleural respiration sensing lead was then tunneled into the subclavicular pocket.  The stimulation lead was also tunneled in the subplatysmal plane into the pocket.  We then connected the sensing as well as stimulation lead to the implantable pulse generator.  Diagnostic evaluation was run again which confirmed a good respiration signal.  We then observed motion of the tongue.  The patient had good bilateral tongue protrusion.  The IPG was secured loosely to the pectoralis fascia using silk sutures.  All the wounds were thoroughly irrigated with bacitracin irrigation.  The wound was then closed in layers using 3-0 Vicryl for the deep layer and 4-0 nylon for the skin layer.  Pressure dressings were placed.  The patient was awakened, extubated and transferred to the recovery room in stable condition.        Incision was made at 8:10am and Procedure ended at 10:45am.

## 2018-02-14 NOTE — IP AVS SNAPSHOT
MRN:[id number]                      After Visit Summary   2/14/2018    Eleazar Hummel    MRN: [id number]           Thank you!     Thank you for choosing [facility] for your care. Our goal is always to provide you with excellent care. Hearing back from our patients is one way we can continue to improve our services. Please take a few minutes to complete the written survey that you may receive in the mail after you visit with us. Thank you!        Patient Information     Date Of Birth          1960        About your hospital stay     You were admitted on:  February 14, 2018 [facility]    You were discharged on:  February 14, 2018       Who to Call     For medical emergencies, please call 911.  For non-urgent questions about your medical care, please call your primary care provider or clinic, None  For questions related to your surgery, please call your surgery clinic        Attending Provider     Provider Rachael García MD Otolaryngology       Primary Care Provider Fax #    [facility] [phone]      After Care Instructions     Diet Instructions       Resume pre procedure diet            Discharge Instructions - Lifting restrictions       Lifting Restrictions 10 pounds until instructed by your physician            No Alcohol       For 24 hours following procedure            No driving or operating machinery       until the day after procedure            Notify Physician        If bleeding or dressing becomes loose or dislodged            Wound care       You may remove the dressings after 48 hours.    You may shower but do not soak or scrub the incision.    Place antibiotic ointment on the incision three times daily to help keep the incisions moist.                  Your next 10 appointments already scheduled     Feb 22, 2018  9:45 AM CST   (Arrive by 9:30 AM)   Return Visit with Rachael Francis  Jada Dover MD   [facility] ([facility])    [address]  4th Floor  [facility] [address] [phone]              Further instructions from your care team       [employer]  Post-Operative Instructions      DIET:      Resume normal diet or soft diet if you have a sore throat.    HYGIENE:      Please wait until 48 hours after surgery before getting incisions on neck, chest, and torso wet.    In the first 48 hours after surgery, will likely need to take sponge baths.    WOUND CARE:      Please leave pressure dressing on for 48 hours after surgery.    Gently place antibiotic ointment over incisions 2-3 times per day; use clean q-tip.    May place a clean bandage over incisions as needed.    After 48 hours, you may get incisions wet with warm soap and water, but do not soak the incisions. Pat area dry gently. Immediately place antibiotic ointment.    Take oral antibiotics as prescribed.    If skin around incision starts to get red (>1cm), swollen, and/or more painful, please call clinic and [phone], option 3.    ACTIVITY:      For the first 72 hours after surgery, please restrict arm movements to slow, small motions, as much as possible. Wearing the arm sling is advisable.    For 2 weeks, avoid any activities that require arm/shoulder motion greater than 90 degrees (arm must stay below shoulder level).    Do not  anything greater than 5 pounds with right hand/arm for 10 days. After 10 days, you may increase weight to 10 pounds.    You may resume light activities of daily living after 10 days.    Avoid any activity that requires extreme twisting of the neck.    No strenuous activity (running, jogging, lifting weights, gardening, sports) for one month or until cleared by physician.    You may walk for exercise starting the day after surgery.    Try to avoid sleeping on your right side, to the extent possible.    PAIN MEDICATIONS:      You will be  "prescribed Vicodin for pain.    If pain is not severe, consider taking Tylenol 650 mg every 6 hours.    Avoid ibuprofen or aspirin for 7 days after surgery.    Avoid direct heat (such as heating pads) to incision sites.    May gently place ice over surgery sites as needed. Please place a thin clean towel over skin first and then place ice bag over towel. Ice for 10 minutes at a time only.          POST-OPERATIVE CLINIC APPOINTMENTS:      1 week: suture removal and wound check at Dr. Dover's ENT clinic ([facility], 4th floor, [facility]; [phone], option 3)    1 month: device activation and wound check at Sleep Clinic ([facility] with Dr. Abdi Lake; [phone])    2 months: device titration sleep study at Sleep Lab ([facility]; [phone])    4 months: final wound check at Dr. Dover's ENT clinic    Yearly: device checks at sleep clinic    SCAR CARE:      After incisions have healed, you will have a scar, which will continue to evolve over the course of 12 months. Caring for your incision scars will help them to be as minimal as possible.    If you are out in the sun with incision exposure, please remember to place sunscreen over the incision and surrounding skin.    You may use vitamin E or \"Scar ointment/cream\" to help soften scar. Please wait one month after surgery before starting this.            [facility]  Home Care Following Anesthesia  For 24 hours after surgery:  1. Get plenty of rest.  A responsible adult must stay with you for at least 24 hours after you leave the surgery center.  2. Do not drive or use heavy equipment.  If you have weakness or tingling, don't drive or use heavy equipment until this feeling goes away.   3. Do not drink alcohol.   4. Avoid strenuous or risky activities.  Ask for help when climbing stairs.  5. You may feel lightheaded.  IF so, sit for " "a few minutes before standing.  Have someone help you get up.   6. If you have nausea (feel sick to your stomach): Drink only clear liquids such as apple juice, ginger ale, broth or 7-Up.  Rest may also help.  Be sure to drink enough fluids.  Move to a regular diet as you feel able.   7. You may have a slight fever.  Call the doctor if your fever is over 100 F (37.7 C) (taken under the tongue) or lasts longer than 24 hours.  8. You may have a dry mouth, a sore throat, muscle aches or trouble sleeping. These should go away after 24 hours.  9. Do not make important or legal decisions.        Today you received a Marcaine or bupivacaine block to numb the nerves near your surgery site.  This is a block using local anesthetic or \"numbing\" medication injected around the nerves to anesthetize or \"numb\" the area supplied by those nerves.  This block is injected into the muscle layer near your surgical site.  The medication may numb the location where you had surgery for 6-18 hours, but may last up to 24 hours.  If your surgical site is an arm or leg you should be careful with your affected limb, since it is possible to injure your limb without being aware of it due to the numbing.  Until full feeling returns, you should guard against bumping or hitting your limb, and avoid extreme hot or cold temperatures on the skin.  As the block wears off, the feeling will return as a tingling or prickly sensation near your surgical site.  You will experience more discomfort from your incision as the feeling returns.  You may want to take a pain pill (a narcotic or Tylenol if this was prescribed by your surgeon) when you start to experience mild pain before the pain beccomes more severe.  If your pain medications do not control your pain you should notifiy your surgeon.    Tips for taking pain medications  To get the best pain relief possible, remember these points:    Take pain medications as directed, before pain becomes " severe.    Pain medication can upset your stomach: taking it with food may help.    Constipation is a common side effect of pain medication. Drink plenty of  fluids.    Eat foods high in fiber. Take a stool softener if recommended by your doctor or pharmacist.    Do not drink alcohol, drive or operate machinery while taking pain medications.    Ask about other ways to control pain, such as with heat, ice or relaxation.    Tylenol/Acetaminophen Consumption  To help encourage the safe use of acetaminophen, the makers of TYLENOL  have lowered the maximum daily dose for single-ingredient Extra Strength TYLENOL  (acetaminophen) products sold in the [place] from 8 pills per day (4,000 mg) to 6 pills per day (3,000 mg). The dosing interval has also changed from 2 pills every 4-6 hours to 2 pills every 6 hours.    If you feel your pain relief is insufficient, you may take Tylenol/Acetaminophen in addition to your narcotic pain medication.     Be careful not to exceed 3,000 mg of Tylenol/Acetaminophen in a 24 hour period from all sources.    If you are taking extra strength Tylenol/acetaminophen (500 mg), the maximum dose is 6 tablets in 24 hours.    If you are taking regular strength acetaminophen (325 mg), the maximum dose is 9 tablets in 24 hours.    Call a doctor for any of the followin. Signs of infection (fever, growing tenderness at the surgery site, a large amount of drainage or bleeding, severe pain, foul-smelling drainage, redness, swelling).  2. It has been over 8 to 10 hours since surgery and you are still not able to urinate (pass water).  3. Headache for over 24 hours.  4. Numbness, tingling or weakness the day after surgery (if you had spinal anesthesia).  Your doctor is:  Dr. Rachael Dover, ENT Otolaryngology: [phone]  Or dial [phone] and ask for the resident on call for:  ENT Otolaryngology  For emergency care, call the:  [facility]:  [phone] (TTY for hearing impaired:  "[phone])                Pending Results     No orders found from 2/12/2018 to 2/15/2018.            Admission Information     Date & Time Provider Department Dept. Phone    2/14/2018 Rachael Dover MD [facility] [phone]      Your Vitals Were     Blood Pressure Temperature Respirations Height Weight Pulse Oximetry    119/80 97.4  F (36.3  C) (Temporal) 16 1.943 m (6' 4.5\") 127 kg (280 lb) 97%    BMI (Body Mass Index)                   33.64 kg/m2           MyChart Information     [employer] gives you secure access to your electronic health record. If you see a primary care provider, you can also send messages to your care team and make appointments. If you have questions, please call your primary care clinic.  If you do not have a primary care provider, please call [phone] and they will assist you.      [employer] is an electronic gateway that provides easy, online access to your medical records. With [employer], you can request a clinic appointment, read your test results, renew a prescription or communicate with your care team.     To access your existing account, please contact your [facility] or call [phone] for assistance.        Care EveryWhere ID     This is your Care EveryWhere ID. This could be used by other organizations to access your [place] medical records  [id number]        Equal Access to Services     MARIA DEL ROSARIO RIOJAS AH: Hadii shelton kento Sodorothy, waaxda luqadaha, qaybta kaalmada sachin russell. So [facility] [phone].    ATENCIÓN: Si habla español, tiene a rincon disposición servicios gratuitos de asistencia lingüística. Llame al [phone].    We comply with applicable federal civil rights laws and [place] laws. We do not discriminate on the basis of race, color, national origin, age, disability, sex, sexual orientation, or gender identity.               Review of your medicines    "   START taking        Dose / Directions    cephALEXin 500 MG capsule   Commonly known as:  KEFLEX   Used for:  Post-op pain        Dose:  500 mg   Take 1 capsule (500 mg) by mouth 3 times daily for 7 days   Quantity:  21 capsule   Refills:  0       HYDROcodone-acetaminophen 7.5-325 MG per tablet   Commonly known as:  NORCO   Used for:  Post-op pain        Dose:  1-2 tablet   Take 1-2 tablets by mouth every 6 hours as needed for moderate to severe pain maximum 8 tablet(s) per day   Quantity:  50 tablet   Refills:  0       ondansetron 4 MG ODT tab   Commonly known as:  ZOFRAN ODT   Used for:  Post-op pain        Dose:  4 mg   Take 1 tablet (4 mg) by mouth every 8 hours as needed for nausea   Quantity:  9 tablet   Refills:  0         CONTINUE these medicines which have NOT CHANGED        Dose / Directions    ADVIL PO        Refills:  0            Where to get your medicines      These medications were sent to [address] [address]    Hours:  TRANSPLANT PHONE NUMBER [phone] Phone:  [phone]     cephALEXin 500 MG capsule    ondansetron 4 MG ODT tab         Some of these will need a paper prescription and others can be bought over the counter. Ask your nurse if you have questions.     Bring a paper prescription for each of these medications     HYDROcodone-acetaminophen 7.5-325 MG per tablet                Protect others around you: Learn how to safely use, store and throw away your medicines at www.disposemymeds.org.        ANTIBIOTIC INSTRUCTION     You've Been Prescribed an Antibiotic - Now What?  Your healthcare team thinks that you or your loved one might have an infection. Some infections can be treated with antibiotics, which are powerful, life-saving drugs. Like all medications, antibiotics have side effects and should only be used when necessary. There are some important things you should know  about your antibiotic treatment.      Your healthcare team may run tests before you start taking an antibiotic.    Your team may take samples (e.g., from your blood, urine or other areas) to run tests to look for bacteria. These test can be important to determine if you need an antibiotic at all and, if you do, which antibiotic will work best.      Within a few days, your healthcare team might change or even stop your antibiotic.    Your team may start you on an antibiotic while they are working to find out what is making you sick.    Your team might change your antibiotic because test results show that a different antibiotic would be better to treat your infection.    In some cases, once your team has more information, they learn that you do not need an antibiotic at all. They may find out that you don't have an infection, or that the antibiotic you're taking won't work against your infection. For example, an infection caused by a virus can't be treated with antibiotics. Staying on an antibiotic when you don't need it is more likely to be harmful than helpful.      You may experience side effects from your antibiotic.    Like all medications, antibiotics have side effects. Some of these can be serious.    Let you healthcare team know if you have any known allergies when you are admitted to the hospital.    One significant side effect of nearly all antibiotics is the risk of severe and sometimes deadly diarrhea caused by Clostridium difficile (C. Difficile). This occurs when a person takes antibiotics because some good germs are destroyed. Antibiotic use allows C. diificile to take over, putting patients at high risk for this serious infection.    As a patient or caregiver, it is important to understand your or your loved one's antibiotic treatment. It is especially important for caregivers to speak up when patients can't speak for themselves. Here are some important questions to ask your healthcare team.    What  infection is this antibiotic treating and how do you know I have that infection?    What side effects might occur from this antibiotic?    How long will I need to take this antibiotic?    Is it safe to take this antibiotic with other medications or supplements (e.g., vitamins) that I am taking?     Are there any special directions I need to know about taking this antibiotic? For example, should I take it with food?    How will I be monitored to know whether my infection is responding to the antibiotic?    What tests may help to make sure the right antibiotic is prescribed for me?      Information provided by:  www.cdc.gov/getsmart  U.S. Department of Health and Human Services  Centers for disease Control and Prevention  National Center for Emerging and Zoonotic Infectious Diseases  Division of Healthcare Quality Promotion        Information about OPIOIDS     PRESCRIPTION OPIOIDS: WHAT YOU NEED TO KNOW    Prescription opioids can be used to help relieve moderate to severe pain and are often prescribed following a surgery or injury, or for certain health conditions. These medications can be an important part of treatment but also come with serious risks. It is important to work with your health care provider to make sure you are getting the safest, most effective care.    WHAT ARE THE RISKS AND SIDE EFFECTS OF OPIOID USE?  Prescription opioids carry serious risks of addiction and overdose, especially with prolonged use. An opioid overdose, often marked by slowed breathing can cause sudden death. The use of prescription opioids can have a number of side effects as well, even when taken as directed:      Tolerance - meaning you might need to take more of a medication for the same pain relief    Physical dependence - meaning you have symptoms of withdrawal when a medication is stopped    Increased sensitivity to pain    Constipation    Nausea, vomiting, and dry mouth    Sleepiness and  dizziness    Confusion    Depression    Low levels of testosterone that can result in lower sex drive, energy, and strength    Itching and sweating    RISKS ARE GREATER WITH:    History of drug misuse, substance use disorder, or overdose    Mental health conditions (such as depression or anxiety)    Sleep apnea    Older age (65 years or older)    Pregnancy    Avoid alcohol while taking prescription opioids.   Also, unless specifically advised by your health care provider, medications to avoid include:    Benzodiazepines (such as Xanax or Valium)    Muscle relaxants (such as Soma or Flexeril)    Hypnotics (such as Ambien or Lunesta)    Other prescription opioids    KNOW YOUR OPTIONS:  Talk to your health care provider about ways to manage your pain that do not involve prescription opioids. Some of these options may actually work better and have fewer risks and side effects:    Pain relievers such as acetaminophen, ibuprofen, and naproxen    Some medications that are also used for depression or seizures    Physical therapy and exercise    Cognitive behavioral therapy, a psychological, goal-directed approach, in which patients learn how to modify physical, behavioral, and emotional triggers of pain and stress    IF YOU ARE PRESCRIBED OPIOIDS FOR PAIN:    Never take opioids in greater amounts or more often than prescribed    Follow up with your primary health care provider and work together to create a plan on how to manage your pain.    Talk about ways to help manage your pain that do not involve prescription opioids    Talk about all concerns and side effects    Help prevent misuse and abuse    Never sell or share prescription opioids    Never use another person's prescription opioids    Store prescription opioids in a secure place and out of reach of others (this may include visitors, children, friends, and family)    Visit www.cdc.gov/drugoverdose to learn about risks of opioid abuse and overdose    If you believe  you may be struggling with addiction, tell your health care provider and ask for guidance or call [facility]'s National Helpline at 6-777-357-HELP    LEARN MORE / www.cdc.gov/drugoverdose/prescribing/guideline.html    Safely dispose of unused prescription opioids: Find your local drug take-back programs and more information about the importance of safe disposal at www.doseofreality.mn.gov             Medication List: This is a list of all your medications and when to take them. Check marks below indicate your daily home schedule. Keep this list as a reference.      Medications           Morning Afternoon Evening Bedtime As Needed    ADVIL PO                                cephALEXin 500 MG capsule   Commonly known as:  KEFLEX   Take 1 capsule (500 mg) by mouth 3 times daily for 7 days                                HYDROcodone-acetaminophen 7.5-325 MG per tablet   Commonly known as:  NORCO   Take 1-2 tablets by mouth every 6 hours as needed for moderate to severe pain maximum 8 tablet(s) per day                                ondansetron 4 MG ODT tab   Commonly known as:  ZOFRAN ODT   Take 1 tablet (4 mg) by mouth every 8 hours as needed for nausea

## 2018-02-14 NOTE — BRIEF OP NOTE
[facility]    Brief Operative Note    Pre-operative diagnosis: Obstructive Sleep Apnea  Post-operative diagnosis * No post-op diagnosis entered *  Procedure: Procedure(s):  Left Hypoglossal Nerve Stimulator Implant - Wound Class: I-Clean  Surgeon: Surgeon(s) and Role:     * Rachael Dover MD - Primary  Anesthesia: General   Estimated blood loss: 50 mL  Drains: None  Specimens: * No specimens in log *  Findings:   None.  Complications: None.  Implants: None  .

## 2018-02-22 ENCOUNTER — OFFICE VISIT (OUTPATIENT)
Dept: OTOLARYNGOLOGY | Facility: CLINIC | Age: 58
End: 2018-02-22
Payer: COMMERCIAL

## 2018-02-22 VITALS — BODY MASS INDEX: 32.94 KG/M2 | HEIGHT: 77 IN | WEIGHT: 279 LBS

## 2018-02-22 DIAGNOSIS — G47.33 OSA (OBSTRUCTIVE SLEEP APNEA): Primary | ICD-10-CM

## 2018-02-22 ASSESSMENT — PAIN SCALES - GENERAL: PAINLEVEL: MILD PAIN (2)

## 2018-02-22 NOTE — PROGRESS NOTES
CHIEF COMPLAINT:  Status post left hypoglossal nerve stimulator implant on February 14, 2018.      HISTORY OF PRESENT ILLNESS:  The patient returns for his first postoperative check.  Overall, he reports he is doing well.  He is still sore by his ribs and has a little bit of puffiness in his neck, but otherwise he is improving on a daily basis.  He has no specific complaints.      PHYSICAL EXAMINATION:   GENERAL:  No acute distress.   NECK:  Incision is clean, dry and intact.  Sutures were removed.  No sign of infection, hematoma or seroma.     CHEST:  Both chest and trunk incisions are clean, dry and intact.  Sutures were removed today.  No sign of infection, hematoma or seroma.      ASSESSMENT AND PLAN:  The patient is healing as expected after surgery.  He has not yet been set up for his activation appointment so I will touch base with the Sleep Center to help with that.  Finally, I would like to see him back in three months to ensure that he has healed appropriately and that he is having no specific issues with the device.

## 2018-02-22 NOTE — NURSING NOTE
"Chief Complaint   Patient presents with     RECHECK     Postop follow up Inspire device     Height 1.943 m (6' 4.5\"), weight 126.6 kg (279 lb).    Julio Cortez    "

## 2018-02-22 NOTE — PATIENT INSTRUCTIONS
Plan of Care:  1.  Follow up with Dr. Dover in 3 months time    Clinic Information:  1. To schedule an appointment please call [phone], option 1  2. To talk to a Triage RN please call [phone] select option 3      Tonja Gordon RN  2/22/2018 10:03 AM

## 2018-02-22 NOTE — LETTER
2/22/2018       RE: Eleazar Hummel  [address] [facility] [address]     Dear Colleague,    Thank you for referring your patient, Eleazar Hummel, to the [facility] EAR NOSE AND THROAT at [facility]. Please see a copy of my visit note below.    CHIEF COMPLAINT:  Status post left hypoglossal nerve stimulator implant on February 14, 2018.      HISTORY OF PRESENT ILLNESS:  The patient returns for his first postoperative check.  Overall, he reports he is doing well.  He is still sore by his ribs and has a little bit of puffiness in his neck, but otherwise he is improving on a daily basis.  He has no specific complaints.      PHYSICAL EXAMINATION:   GENERAL:  No acute distress.   NECK:  Incision is clean, dry and intact.  Sutures were removed.  No sign of infection, hematoma or seroma.     CHEST:  Both chest and trunk incisions are clean, dry and intact.  Sutures were removed today.  No sign of infection, hematoma or seroma.      ASSESSMENT AND PLAN:  The patient is healing as expected after surgery.  He has not yet been set up for his activation appointment so I will touch base with the [facility] to help with that.  Finally, I would like to see him back in three months to ensure that he has healed appropriately and that he is having no specific issues with the device.         Again, thank you for allowing me to participate in the care of your patient.      Sincerely,    Rachael Dover MD

## 2018-02-22 NOTE — MR AVS SNAPSHOT
"              After Visit Summary   2/22/2018    Eleazar Hummel    MRN: [id number]           Patient Information     Date Of Birth          1960        Visit Information        Provider Department      2/22/2018 9:45 AM Rachael Dover MD [facility] Ear Nose and Throat        Care Instructions    Plan of Care:  1.  Follow up with Dr. Dover in 3 months time    Clinic Information:  1. To schedule an appointment please call [phone], option 1  2. To talk to a Triage RN please call [phone] select option 3      Tonja Gordon RN  2/22/2018 10:03 AM            Follow-ups after your visit        Who to contact     Please call your clinic at [phone] to:    Ask questions about your health    Make or cancel appointments    Discuss your medicines    Learn about your test results    Speak to your doctor            Additional Information About Your Visit        GnamGnamhart Information     [employer] gives you secure access to your electronic health record. If you see a primary care provider, you can also send messages to your care team and make appointments. If you have questions, please call your primary care clinic.  If you do not have a primary care provider, please call [phone] and they will assist you.      [employer] is an electronic gateway that provides easy, online access to your medical records. With [employer], you can request a clinic appointment, read your test results, renew a prescription or communicate with your care team.     To access your existing account, please contact your [facility] or call [phone] for assistance.        Care EveryWhere ID     This is your Care EveryWhere ID. This could be used by other organizations to access your [place] medical records  [id number]        Your Vitals Were     Height BMI (Body Mass Index)                1.943 m (6' 4.5\") 33.52 kg/m2           Blood Pressure from Last 3 Encounters:   02/14/18 120/79 "   06/28/17 124/85   06/15/17 129/80    Weight from Last 3 Encounters:   02/22/18 126.6 kg (279 lb)   02/14/18 127 kg (280 lb)   06/28/17 127 kg (280 lb)              Today, you had the following     No orders found for display       Primary Care Provider Fax #    [facility] [phone]       [facility] [address]  [facility] [address]        Equal Access to Services     MARIA DEL ROSARIO RIOJAS : Hadii aad ku hadasho Soomaali, waaxda luqadaha, qaybta kaalmada adeegyada, waxay idiin hayaan adeeg lyndabeckimichelle la'zayra . So [facility] [phone].    ATENCIÓN: Si habla español, tiene a rincon disposición servicios gratuitos de asistencia lingüística. [facility] [phone].    We comply with applicable federal civil rights laws and [place] laws. We do not discriminate on the basis of race, color, national origin, age, disability, sex, sexual orientation, or gender identity.            Thank you!     Thank you for choosing [facility] EAR NOSE AND THROAT  for your care. Our goal is always to provide you with excellent care. Hearing back from our patients is one way we can continue to improve our services. Please take a few minutes to complete the written survey that you may receive in the mail after your visit with us. Thank you!             Your Updated Medication List - Protect others around you: Learn how to safely use, store and throw away your medicines at www.disposemymeds.org.          This list is accurate as of 2/22/18 10:03 AM.  Always use your most recent med list.                   Brand Name Dispense Instructions for use Diagnosis    ADVIL PO           HYDROcodone-acetaminophen 7.5-325 MG per tablet    NORCO    50 tablet    Take 1-2 tablets by mouth every 6 hours as needed for moderate to severe pain maximum 8 tablet(s) per day    Post-op pain       ondansetron 4 MG ODT tab    ZOFRAN ODT    9 tablet    Take 1 tablet (4 mg) by mouth every 8 hours as needed for nausea    Post-op pain

## 2018-03-21 ENCOUNTER — OFFICE VISIT (OUTPATIENT)
Dept: SLEEP MEDICINE | Facility: CLINIC | Age: 58
End: 2018-03-21
Payer: COMMERCIAL

## 2018-03-21 VITALS
TEMPERATURE: 98.6 F | OXYGEN SATURATION: 96 % | DIASTOLIC BLOOD PRESSURE: 75 MMHG | BODY MASS INDEX: 33.24 KG/M2 | SYSTOLIC BLOOD PRESSURE: 115 MMHG | HEART RATE: 76 BPM | HEIGHT: 77 IN | WEIGHT: 281.5 LBS | RESPIRATION RATE: 16 BRPM

## 2018-03-21 DIAGNOSIS — G47.33 OBSTRUCTIVE SLEEP APNEA: Primary | ICD-10-CM

## 2018-03-21 DIAGNOSIS — G47.10 HYPERSOMNOLENCE: ICD-10-CM

## 2018-03-21 DIAGNOSIS — Z78.9 INTOLERANCE OF CONTINUOUS POSITIVE AIRWAY PRESSURE (CPAP) VENTILATION: ICD-10-CM

## 2018-03-21 PROCEDURE — 99205 OFFICE O/P NEW HI 60 MIN: CPT | Performed by: INTERNAL MEDICINE

## 2018-03-21 NOTE — NURSING NOTE
"Chief Complaint   Patient presents with     Sleep Problem     Inspire UAS activation. Implanted on 02/14/2018       Initial /75  Pulse 76  Temp 98.6  F (37  C) (Tympanic)  Resp 16  Ht 1.956 m (6' 5\")  Wt 127.7 kg (281 lb 8 oz)  SpO2 96%  BMI 33.38 kg/m2 Estimated body mass index is 33.38 kg/(m^2) as calculated from the following:    Height as of this encounter: 1.956 m (6' 5\").    Weight as of this encounter: 127.7 kg (281 lb 8 oz).  Medication Reconciliation: complete     Neck circumference: 17.5 inches or 44cm    ELSA Pacheco  Sleep Clinic-Specialist,    Registered Medical Assistant   [facility]        "

## 2018-03-21 NOTE — PATIENT INSTRUCTIONS
Programming in clinic:   Telemetry cable was applied to his device and the device was activated.    Sensation Threshold (ST): 1.1 volts  Functional Threshold (FT): 1.8 volts  Tongue Motion Phenotype at FT: straight forward to teeth  Final Amplitude: 2.7 volts  Patient Control Lower Limit: 1.6 volts  Patient Control Upper Limit: 2.7 volts  Sensor Waveform: The sensor waveform showed both upward and downward deflections associated with the patient's breathing pattern.   Other Programming: change pause from 15 to 20 minutes      See instruction sheet for home titration  Take notes on sheet and bring with you to in lab titration study    Your BMI is Body mass index is 33.38 kg/(m^2).  Weight management is a personal decision.  If you are interested in exploring weight loss strategies, the following discussion covers the approaches that may be successful. Body mass index (BMI) is one way to tell whether you are at a healthy weight, overweight, or obese. It measures your weight in relation to your height.  A BMI of 18.5 to 24.9 is in the healthy range. A person with a BMI of 25 to 29.9 is considered overweight, and someone with a BMI of 30 or greater is considered obese. More than two-thirds of American adults are considered overweight or obese.  Being overweight or obese increases the risk for further weight gain. Excess weight may lead to heart disease and diabetes.  Creating and following plans for healthy eating and physical activity may help you improve your health.  Weight control is part of healthy lifestyle and includes exercise, emotional health, and healthy eating habits. Careful eating habits lifelong are the mainstay of weight control. Though there are significant health benefits from weight loss, long-term weight loss with diet alone may be very difficult to achieve- studies show long-term success with dietary management in less than 10% of people. Attaining a healthy weight may be especially difficult to  achieve in those with severe obesity. In some cases, medications, devices and surgical management might be considered.  What can you do?  If you are overweight or obese and are interested in methods for weight loss, you should discuss this with your provider.     Consider reducing daily calorie intake by 500 calories.     Keep a food journal.     Avoiding skipping meals, consider cutting portions instead.    Diet combined with exercise helps maintain muscle while optimizing fat loss. Strength training is particularly important for building and maintaining muscle mass. Exercise helps reduce stress, increase energy, and improves fitness. Increasing exercise without diet control, however, may not burn enough calories to loose weight.       Start walking three days a week 10-20 minutes at a time    Work towards walking thirty minutes five days a week     Eventually, increase the speed of your walking for 1-2 minutes at time    In addition, we recommend that you review healthy lifestyles and methods for weight loss available through the National Institutes of Health patient information sites:  http://win.niddk.nih.gov/publications/index.htm    And look into health and wellness programs that may be available through your health insurance provider, employer, local community center, or richar club.    Weight management plan: Patient was referred to their PCP to discuss a diet and exercise plan.

## 2018-03-21 NOTE — MR AVS SNAPSHOT
After Visit Summary   3/21/2018    Eleazar Hummel    MRN: [id number]           Patient Information     Date Of Birth          1960        Visit Information        Provider Department      3/21/2018 10:00 AM Kimberly Alvarado MD [facility] [place], Sleep Study        Today's Diagnoses     Obstructive sleep apnea    -  1      Care Instructions    Programming in clinic:   Telemetry cable was applied to his device and the device was activated.    Sensation Threshold (ST): 1.1 volts  Functional Threshold (FT): 1.8 volts  Tongue Motion Phenotype at FT: straight forward to teeth  Final Amplitude: 2.7 volts  Patient Control Lower Limit: 1.6 volts  Patient Control Upper Limit: 2.7 volts  Sensor Waveform: The sensor waveform showed both upward and downward deflections associated with the patient's breathing pattern.   Other Programming: change pause from 15 to 20 minutes      See instruction sheet for home titration  Take notes on sheet and bring with you to in lab titration study    Your BMI is Body mass index is 33.38 kg/(m^2).  Weight management is a personal decision.  If you are interested in exploring weight loss strategies, the following discussion covers the approaches that may be successful. Body mass index (BMI) is one way to tell whether you are at a healthy weight, overweight, or obese. It measures your weight in relation to your height.  A BMI of 18.5 to 24.9 is in the healthy range. A person with a BMI of 25 to 29.9 is considered overweight, and someone with a BMI of 30 or greater is considered obese. More than two-thirds of American adults are considered overweight or obese.  Being overweight or obese increases the risk for further weight gain. Excess weight may lead to heart disease and diabetes.  Creating and following plans for healthy eating and physical activity may help you improve your health.  Weight control is part of healthy lifestyle and includes exercise, emotional health,  and healthy eating habits. Careful eating habits lifelong are the mainstay of weight control. Though there are significant health benefits from weight loss, long-term weight loss with diet alone may be very difficult to achieve- studies show long-term success with dietary management in less than 10% of people. Attaining a healthy weight may be especially difficult to achieve in those with severe obesity. In some cases, medications, devices and surgical management might be considered.  What can you do?  If you are overweight or obese and are interested in methods for weight loss, you should discuss this with your provider.     Consider reducing daily calorie intake by 500 calories.     Keep a food journal.     Avoiding skipping meals, consider cutting portions instead.    Diet combined with exercise helps maintain muscle while optimizing fat loss. Strength training is particularly important for building and maintaining muscle mass. Exercise helps reduce stress, increase energy, and improves fitness. Increasing exercise without diet control, however, may not burn enough calories to loose weight.       Start walking three days a week 10-20 minutes at a time    Work towards walking thirty minutes five days a week     Eventually, increase the speed of your walking for 1-2 minutes at time    In addition, we recommend that you review healthy lifestyles and methods for weight loss available through the National Institutes of Health patient information sites:  http://win.niddk.nih.gov/publications/index.htm    And look into health and wellness programs that may be available through your health insurance provider, employer, local community center, or richar club.    Weight management plan: Patient was referred to their PCP to discuss a diet and exercise plan.              Follow-ups after your visit        Your next 10 appointments already scheduled     Apr 18, 2018  8:00 PM CDT   PSG Titration with SLEEP STUDY RM 4   [facility],  [place], Sleep Study ([facility])    [address] [place] [id number] [phone]            May 24, 2018  9:30 AM CDT   (Arrive by 9:15 AM)   Return Visit with Rachael Dover MD   [facility] Ear Nose and Throat ([facility] [place])    [address] [facility]  [address] [facility] [address] [phone]              Future tests that were ordered for you today     Open Future Orders        Priority Expected Expires Ordered    Comprehensive Sleep Study Routine  9/17/2018 3/21/2018            Who to contact     If you have questions or need follow up information about today's clinic visit or your schedule please contact [facility] directly at [phone].  Normal or non-critical lab and imaging results will be communicated to you by GIVTEDhart, letter or phone within 4 business days after the clinic has received the results. If you do not hear from us within 7 days, please contact the clinic through GIVTEDhart or phone. If you have a critical or abnormal lab result, we will notify you by phone as soon as possible.  Submit refill requests through [employer] or call your pharmacy and they will forward the refill request to us. Please allow 3 business days for your refill to be completed.          Additional Information About Your Visit        [employer] Information     [employer] gives you secure access to your electronic health record. If you see a primary care provider, you can also send messages to your care team and make appointments. If you have questions, please call your primary care clinic.  If you do not have a primary care provider, please call [phone] and they will assist you.        Care EveryWhere ID     This is your Care EveryWhere ID. This could be used by other organizations to access your [place] medical records  [id number]        Your Vitals Were     Pulse Temperature Respirations Height Pulse Oximetry  "BMI (Body Mass Index)    76 98.6  F (37  C) (Tympanic) 16 1.956 m (6' 5\") 96% 33.38 kg/m2       Blood Pressure from Last 3 Encounters:   03/21/18 115/75   02/14/18 120/79   06/28/17 124/85    Weight from Last 3 Encounters:   03/21/18 127.7 kg (281 lb 8 oz)   02/22/18 126.6 kg (279 lb)   02/14/18 127 kg (280 lb)               Primary Care Provider Fax #    [facility] [phone]       [facility] [address]  [facility] [address]        Equal Access to Services     MARIA DEL ROSARIO RIOJAS : Sandra Bowers, waalexandrda saranadaha, qaybta kaalmada cameliada, sachin hou. So [facility] [phone].    ATENCIÓN: Si habla español, tiene a rincon disposición servicios gratuitos de asistencia lingüística. Llame al [phone].    We comply with applicable federal civil rights laws and [place] laws. We do not discriminate on the basis of race, color, national origin, age, disability, sex, sexual orientation, or gender identity.            Thank you!     Thank you for choosing [facility]  for your care. Our goal is always to provide you with excellent care. Hearing back from our patients is one way we can continue to improve our services. Please take a few minutes to complete the written survey that you may receive in the mail after your visit with us. Thank you!             Your Updated Medication List - Protect others around you: Learn how to safely use, store and throw away your medicines at www.disposemymeds.org.          This list is accurate as of 3/21/18 10:58 AM.  Always use your most recent med list.                   Brand Name Dispense Instructions for use Diagnosis    ADVIL PO             "

## 2018-03-21 NOTE — PROGRESS NOTES
"CHIEF COMPLAINT:  The patient is here to activate upper airway stimulation therapy for obstructive sleep apnea.       HISTORY OF PRESENT ILLNESS: 58-year-old gentleman with history of severe obstructive sleep apnea.  He did not tolerate CPAP in the past.  He was part of a clinical trial for upper airway stimulation device.  He had an excellent clinical benefit with that result however at the end of the trial the device needed to be removed.  He continues to have the previous leads in place.  He recently underwent implantation of the new device on the left side of his chest.  He reports good healing at the surgical sites.  He is eager to get started on therapy again.  He has noticed daytime sleepiness recur since discontinuing previous therapy.  He drinks about 3-5 caffeinated beverages a day.  He does need to pull over sometimes as he gets a little sleepy behind the wheel when he drives all the way to [place].  Typical bedtime is around 10 PM, he usually falls asleep within 15 minutes.  He does not use any sleep aids.  He usually out of bed in the morning by 630-7.  On the weekends he may go back to sleep after that for little bit.  He does wake up at night on occasion to take the dog out.  He does not use any tobacco or marijuana products.    Epwotth sleepiness scale today: 15 (less than 10 normal)    Baseline PSG Date:5/26/2017  Wt 265 lbs (actually was as 280)  AHI 31.9  Supine AHI 52.5  Lateral AHI 26.4  REM AHI 61.6  RDI 56.6    Implantation Date: 2/14/2018    Removal of previous device 6/28/2017       Past Medical History:   Diagnosis Date     Obstructive sleep apnea 2009       MEDICATIONS AND ALLERGIES:  Reviewed.    rare ibuprofen     Exam: Alert, NAD  VITAL SIGNS: /75  Pulse 76  Temp 98.6  F (37  C) (Tympanic)  Resp 16  Ht 1.956 m (6' 5\")  Wt 127.7 kg (281 lb 8 oz)  SpO2 96%  BMI 33.38 kg/m2  ENT/Neuro: Normal speech, normal tongue movements, as far as protrusion is central to top of teeth with " "functional threshold stimulus    No evidence of tongue deviation, weakness, atrophy, hypertrophy or fasciculations.   No difficulty with swallowing or speech.  Chest: Incision Check: All three incisions were inspected.  No evidence of infection.     Programming in clinic:   Telemetry cable was applied to his device and the device was activated.    Sensation Threshold (ST): 1.1 volts  Functional Threshold (FT): 1.8 volts  Tongue Motion Phenotype at FT: straight forward to teeth  Final Amplitude: 2.7 volts  Patient Control Lower Limit: 1.6 volts  Patient Control Upper Limit: 2.7 volts  Sensor Waveform: The sensor waveform showed both upward and downward deflections associated with the patient's breathing pattern.   Other Programming: change pause from 15 to 20 minutes  The patient tolerated this well.           ASSESSMENT:58 year old male  with a history of severe obstructive sleep apnea, now status post upper airway stimulation implantation on the left hypoglossal nerve with good wound healing and good tolerance of the initial functional threshold voltages.       PLAN:   Patient was consented and enrolled in Research/ patient registry.  The patient was given the UAS sleep remote and a patient manual.  The patient was educated on proper use of the patient sleep remote.  The patient demonstrated competency with the remote and was given a quick guide and access to an instructional video.  The patient was instructed to use UAS device \"all-night, every-night\".  The patient was instructed to increase stimulation amplitude 1 step (0.1 volts) every two to three nights until reaching the maximum level or until stimulation becomes uncomfortable.  If stimulation becomes uncomfortable, the patient was instructed to decrease the stimulation amplitude and try increasing it again in 2-3 nights.  PSG ordered for all-night titration to find effective therapy settings in one month.    is agreeable with this plan.       Sixty minutes " spent with the patient, over 50% spent in counseling and coordinating care, and programming the device.           RIMA HORNE MD      The above note was dictated using voice recognition software and may include typographical errors. Please contact the author for any clarifications.

## 2018-04-18 ENCOUNTER — THERAPY VISIT (OUTPATIENT)
Dept: SLEEP MEDICINE | Facility: CLINIC | Age: 58
End: 2018-04-18
Payer: COMMERCIAL

## 2018-04-18 DIAGNOSIS — G47.33 OBSTRUCTIVE SLEEP APNEA: ICD-10-CM

## 2018-04-18 PROCEDURE — 95811 POLYSOM 6/>YRS CPAP 4/> PARM: CPT | Performed by: INTERNAL MEDICINE

## 2018-04-18 NOTE — MR AVS SNAPSHOT
After Visit Summary   4/18/2018    Eleazar Hummel    MRN: [id number]           Patient Information     Date Of Birth          1960        Visit Information        Provider Department      4/18/2018 8:00 PM SLEEP STUDY RM 4 [facility] [place], Sleep Study        Today's Diagnoses     Obstructive sleep apnea          Care Instructions    [facility]    1. Your sleep study will be reviewed by a sleep physician within the next few days.     2. Please follow up in the sleep clinic as scheduled, or, make an appointment with your sleep provider to be seen within two weeks to discuss the results of the sleep study.    3. If you have any questions or problems with your treatment plan, please contact your sleep clinic provider at [phone] to further manage your condition.    4. Please review your attached medication list, and, at your follow-up appointment advise your sleep clinic provider about any changes.    5. Go to [email or web address] for more information about your sleep problems.    RE Hodge  April 19, 2018                Follow-ups after your visit        Your next 10 appointments already scheduled     May 24, 2018  9:30 AM CDT   (Arrive by 9:15 AM)   Return Visit with Rachael Dover MD   [facility] ([facility] [place])    [address] [place] [id number] [phone]              Who to contact     If you have questions or need follow up information about today's clinic visit or your schedule please contact [facility] directly at [phone].  Normal or non-critical lab and imaging results will be communicated to you by MyChart, letter or phone within 4 business days after the clinic has received the results. If you do not hear from us within 7 days, please contact the clinic through MyChart or phone. If you have a critical or abnormal lab result, we will  notify you by phone as soon as possible.  Submit refill requests through [employer] or call your pharmacy and they will forward the refill request to us. Please allow 3 business days for your refill to be completed.          Additional Information About Your Visit        [employer] Information     [employer] gives you secure access to your electronic health record. If you see a primary care provider, you can also send messages to your care team and make appointments. If you have questions, please call your primary care clinic.  If you do not have a primary care provider, please call [phone] and they will assist you.        Care EveryWhere ID     This is your Care EveryWhere ID. This could be used by other organizations to access your [place] medical records  [id number]         Blood Pressure from Last 3 Encounters:   03/21/18 115/75   02/14/18 120/79   06/28/17 124/85    Weight from Last 3 Encounters:   03/21/18 127.7 kg (281 lb 8 oz)   02/22/18 126.6 kg (279 lb)   02/14/18 127 kg (280 lb)              We Performed the Following     Comprehensive Sleep Study        Primary Care Provider Fax #    [facility] [phone]       [facility] [address]  [facility] [address]        Equal Access to Services     MARIA DEL ROSARIO RIOJAS : Hadii aad ku hadasho Soomaali, waaxda luqadaha, qaybta kaalmada adeegyada, sachin hou. So [facility] [phone].    ATENCIÓN: Si habla español, tiene a rincon disposición servicios gratuitos de asistencia lingüística. Andriaame al [phone].    We comply with applicable federal civil rights laws and [place] laws. We do not discriminate on the basis of race, color, national origin, age, disability, sex, sexual orientation, or gender identity.            Thank you!     Thank you for choosing [facility] SLEEP STUDY  for your care. Our goal is always to provide you with excellent care. Hearing back from our patients is one way we can  continue to improve our services. Please take a few minutes to complete the written survey that you may receive in the mail after your visit with us. Thank you!             Your Updated Medication List - Protect others around you: Learn how to safely use, store and throw away your medicines at www.disposemymeds.org.          This list is accurate as of 4/18/18 11:59 PM.  Always use your most recent med list.                   Brand Name Dispense Instructions for use Diagnosis    ADVIL PO

## 2018-04-19 NOTE — PATIENT INSTRUCTIONS
[place] SLEEP [facility]    1. Your sleep study will be reviewed by a sleep physician within the next few days.     2. Please follow up in the sleep clinic as scheduled, or, make an appointment with your sleep provider to be seen within two weeks to discuss the results of the sleep study.    3. If you have any questions or problems with your treatment plan, please contact your sleep clinic provider at [phone] to further manage your condition.    4. Please review your attached medication list, and, at your follow-up appointment advise your sleep clinic provider about any changes.    5. Go to [email or web address] for more information about your sleep problems.    Pamela Hoffmann, RPSGT  April 19, 2018

## 2018-04-19 NOTE — NURSING NOTE
Completed a all night titration PSG per provider order.    Preliminary AHI 31.  A final therapeutic UAS voltage  was not achieved.    Supine REM was seen on therapeutic pressure.    Patient reports feeling refreshed in AM.

## 2018-04-22 NOTE — PROCEDURES
" SLEEP STUDY INTERPRETATION  UAS TITRATION STUDY      Patient: ALLAN SEGURA  YOB: 1960  Study Date: 4/18/2018  MRN: [id number]  Referring Provider: Rachael Dover MD  Ordering Provider: Kimberly Alvarado MD    Indications for Polysomnography: The patient is a 58 y old Male who is 6' 5\" and weighs 281.0 lbs. His BMI is 33.5, [place] sleepiness scale 15.0 and neck circumference is 47.0 cm. Relevant medical history includes severe obstructive sleep apnea and removal of neurostimulator previously implanted for treatment of BRIANNA.  A polysomnogram with PAP titration was performed to correct for sleep apnea with new neurostimulator implanted on 2/14/2018. Last diagnostic PSG 5/26/2017  weight 265 AHI 32, supine AHI 53, lateral AHI 26, REM AHI 62     Polysomnogram Data: A full night polysomnogram recorded the standard physiologic parameters including EEG, EOG, EMG, ECG, nasal and oral airflow. Respiratory parameters of chest and abdominal movements were recorded with respiratory inductance plethysmography. Oxygen saturation was recorded by pulse oximetry. Hypopnea scoring rule used: 1B 4%.    Treatment PSG  Sleep Architecture: Decreased sleep efficiency with increased sleep latency and increased wake after sleep onset.  The total recording time of the polysomnogram was 477.8 minutes. The total sleep time was 387.0 minutes. Sleep latency was increased at 35.7 minutes without the use of a sleep aid. REM latency was 95.0 minutes. Arousal index was normal at 17.8 arousals per hour. Sleep efficiency was decreased at 81.0%. Wake after sleep onset was 51.5 minutes. The patient spent 6.7% of total sleep time in Stage N1, 70.9% in Stage N2, 3.4% in Stage N3, and 19.0% in REM. Time in REM supine was 29.5 minutes.    Respiration: REL lateral optimally treated at 2.7 volts, events recurred when patient went supine.    The patient was titrated at pressures ranging from 2.0 to 2.8 volts. The final voltage was 2.8 v " with a residual AHI of 58.1 events per hour. Time in REM supine on final pressure was 0 minutes.     This titration was considered  adequate.    Snoring - was reported as light and intermittent on treatment.    Respiratory rate and pattern - was notable for normal respiratory rate and pattern.    Sustained Sleep Associated Hypoventilation - Transcutaneous carbon dioxide monitoring was not used, however significant hypoventilation was not suggested by oximetry.    Sleep Associated Hypoxemia - (Greater than 5 minutes O2 sat at or below 88%) was not present. Baseline oxygen saturation was 92.9%. Lowest oxygen saturation was 84.7%. Time spent less than or equal to 88% was 1.7 minutes. Time spent less than or equal to 89% was 4.5 minutes.    Movement Activity: Frequent periodic limb movements, most of which were not associated with arousals.    Periodic Limb Activity - There were 99 PLMs during the entire study. The PLM index was 15.3 movements per hour. The PLM Arousal Index was 0.3 per hour.    REM EMG Activity - EMG activity artifact of neurostimulator.    Nocturnal Behavior - Abnormal sleep related behaviors were not noted during.    Bruxism - None apparent.    Cardiac Summary: Normal sinus rhythm and rates.  The average pulse rate was 72.1 bpm. The minimum pulse rate was 59.9 bpm while the maximum pulse rate was 113.0 bpm. Arrhythmias were not noted.          Assessment:     Severe obstructive sleep apnea optimally treated during REM lateral with neurostimulator at 2.7 volts.    Decreased sleep efficiency with increased sleep latency and increased wake after sleep onset.    Frequent periodic limb movements, most of which were not associated with arousals.    Normal sinus rhythm and rates.    Recommendations:    Final programming of 2.5 v with range of 2.0 to 3.0 v.    Encourage lateral sleep positions.    Advice regarding the risks of drowsy driving.    Suggest optimizing sleep schedule and avoiding sleep  deprivation.    Weight management     Pharmacologic therapy should be used for management of restless legs syndrome only if present and clinically indicated and not based on the presence of periodic limb movements alone.    Diagnostic Codes:   Obstructive Sleep Apnea G47.33          _____________________________________   Electronically Signed By: Kimberly Alvarado MD 4/22/2018         Range(%) Time in range (min)   0.0 - 89.0 4.5   0.0 - 88.0 1.7         Stage Min(mm Hg) Max(mm Hg)   Wake - -   NREM(1+2+3) - -   REM - -         Range(mmHg) Time in range (min)   - -   - -

## 2018-05-15 DIAGNOSIS — G47.33 OSA (OBSTRUCTIVE SLEEP APNEA): Primary | ICD-10-CM

## 2018-05-15 NOTE — PROGRESS NOTES
Telephone call-  Patient has no more snoring or sleep disruption on current voltage 2 steps below maximum range. Will schedule HST to verify effectiveness.

## 2018-05-24 ENCOUNTER — OFFICE VISIT (OUTPATIENT)
Dept: SLEEP MEDICINE | Facility: CLINIC | Age: 58
End: 2018-05-24
Payer: COMMERCIAL

## 2018-05-24 ENCOUNTER — OFFICE VISIT (OUTPATIENT)
Dept: OTOLARYNGOLOGY | Facility: CLINIC | Age: 58
End: 2018-05-24
Payer: COMMERCIAL

## 2018-05-24 VITALS
HEIGHT: 77 IN | WEIGHT: 282.6 LBS | DIASTOLIC BLOOD PRESSURE: 74 MMHG | SYSTOLIC BLOOD PRESSURE: 116 MMHG | BODY MASS INDEX: 33.37 KG/M2 | HEART RATE: 85 BPM

## 2018-05-24 DIAGNOSIS — G47.33 OSA (OBSTRUCTIVE SLEEP APNEA): ICD-10-CM

## 2018-05-24 DIAGNOSIS — G47.33 OSA (OBSTRUCTIVE SLEEP APNEA): Primary | ICD-10-CM

## 2018-05-24 PROCEDURE — G0399 HOME SLEEP TEST/TYPE 3 PORTA: HCPCS | Performed by: INTERNAL MEDICINE

## 2018-05-24 ASSESSMENT — PAIN SCALES - GENERAL: PAINLEVEL: NO PAIN (0)

## 2018-05-24 NOTE — LETTER
5/24/2018       RE: Eleazar Hummel  [address] [facility] [address]     Dear Colleague,    Thank you for referring your patient, Eleazar Hummel, to the [facility]. Please see a copy of my visit note below.    CHIEF COMPLAINT:  Status post left hypoglossal nerve stimulator implant on February 14, 2018.      HISTORY OF PRESENT ILLNESS:  The patient returns to clinic.  He is otherwise doing very well.  He is using the device every night.  He has no specific issues.  He has a little bit of chest irritation that happens occasionally.  It is getting better.      PHYSICAL EXAMINATION:   GENERAL:  No acute distress.   NECK:  Left neck incision is well-healed.     CHEST:  Incision is well-healed.  Implant is stable.      ASSESSMENT AND PLAN:  The patient continues to do well after surgery.  He is using the device every night.  He is set up for home sleep study already and is picking up the equipment tonight.  We will see the results in about a week.  If everything looks good, we will just have annual follow-ups.     I spent a total of 15 minutes face-to-face with Eleazar Hummel during today's office visit.  Over 50% of this time was spent counseling the patient on and/or coordinating care as documented in my assessment and plan.    Again, thank you for allowing me to participate in the care of your patient.      Sincerely,    Rachael Dover MD

## 2018-05-24 NOTE — PATIENT INSTRUCTIONS
My home sleep study:    ______I will activate the device as shown on the video    ___X___My device is programmed to start automatically at     _____10:00 pm_________ Time   on ____5/24/18_________  Day/Date    My contact number if I have problems is ____[phone]_______________________________      I will watch the video before I hook it up at night: [email or web address]    In case of an emergency call [phone]

## 2018-05-24 NOTE — PROGRESS NOTES
CHIEF COMPLAINT:  Status post left hypoglossal nerve stimulator implant on February 14, 2018.      HISTORY OF PRESENT ILLNESS:  The patient returns to clinic.  He is otherwise doing very well.  He is using the device every night.  He has no specific issues.  He has a little bit of chest irritation that happens occasionally.  It is getting better.      PHYSICAL EXAMINATION:   GENERAL:  No acute distress.   NECK:  Left neck incision is well-healed.     CHEST:  Incision is well-healed.  Implant is stable.      ASSESSMENT AND PLAN:  The patient continues to do well after surgery.  He is using the device every night.  He is set up for home sleep study already and is picking up the equipment tonight.  We will see the results in about a week.  If everything looks good, we will just have annual follow-ups.     I spent a total of 15 minutes face-to-face with Eleazar Hummel during today's office visit.  Over 50% of this time was spent counseling the patient on and/or coordinating care as documented in my assessment and plan.

## 2018-05-24 NOTE — PATIENT INSTRUCTIONS
We will review the home sleep study once it is returned, if you have not heard in a timely fashion please call the RN care coordinator.  Alex Juarez RN  [phone]

## 2018-05-24 NOTE — MR AVS SNAPSHOT
After Visit Summary   5/24/2018    Eleazar Hummel    MRN: [id number]           Patient Information     Date Of Birth          1960        Visit Information        Provider Department      5/24/2018 9:30 AM Rachael Dover MD [facility] Ear Nose and Throat        Today's Diagnoses     BRIANNA (obstructive sleep apnea)    -  1      Care Instructions    We will review the home sleep study once it is returned, if you have not heard in a timely fashion please call the RN care coordinator.  Alex Juarez RN  [phone]              Follow-ups after your visit        Your next 10 appointments already scheduled     May 31, 2018  2:00 PM CDT   Return Sleep Patient with Franck Lake MD   [facility], Sleep Study ([facility])    [address] [place] [address] [phone]              Who to contact     Please call your clinic at [phone] to:    Ask questions about your health    Make or cancel appointments    Discuss your medicines    Learn about your test results    Speak to your doctor            Additional Information About Your Visit        MyChart Information     [employer] gives you secure access to your electronic health record. If you see a primary care provider, you can also send messages to your care team and make appointments. If you have questions, please call your primary care clinic.  If you do not have a primary care provider, please call [phone] and they will assist you.      [employer] is an electronic gateway that provides easy, online access to your medical records. With [employer], you can request a clinic appointment, read your test results, renew a prescription or communicate with your care team.     To access your existing account, please contact your [facility] or call [phone] for assistance.        Care EveryWhere ID     This is your Care EveryWhere ID. This could be  "used by other organizations to access your [place] medical records  [id number]        Your Vitals Were     Pulse Height BMI (Body Mass Index)             85 1.956 m (6' 5\") 33.51 kg/m2          Blood Pressure from Last 3 Encounters:   05/24/18 116/74   03/21/18 115/75   02/14/18 120/79    Weight from Last 3 Encounters:   05/24/18 128.2 kg (282 lb 9.6 oz)   03/21/18 127.7 kg (281 lb 8 oz)   02/22/18 126.6 kg (279 lb)              Today, you had the following     No orders found for display       Primary Care Provider Fax #    [facility] [phone]       [facility] [address] [place]  [facility] [address]        Equal Access to Services     MARIA DEL ROSARIO RIOJAS : Hadii aad ku hadasho Soomaali, waaxda luqadaha, qaybta kaalmada adeegyada, sachin headley . So [facility] [phone].    ATENCIÓN: Si habla español, tiene a rincon disposición servicios gratuitos de asistencia lingüística. Llame al [phone].    We comply with applicable federal civil rights laws and [place] laws. We do not discriminate on the basis of race, color, national origin, age, disability, sex, sexual orientation, or gender identity.            Thank you!     Thank you for choosing [facility] EAR NOSE AND THROAT  for your care. Our goal is always to provide you with excellent care. Hearing back from our patients is one way we can continue to improve our services. Please take a few minutes to complete the written survey that you may receive in the mail after your visit with us. Thank you!             Your Updated Medication List - Protect others around you: Learn how to safely use, store and throw away your medicines at www.disposemymeds.org.          This list is accurate as of 5/24/18 11:59 PM.  Always use your most recent med list.                   Brand Name Dispense Instructions for use Diagnosis    ADVIL PO             "

## 2018-05-24 NOTE — NURSING NOTE
"Chief Complaint   Patient presents with     RECHECK     3 month follow up. no complications     Blood pressure 116/74, pulse 85, height 1.956 m (6' 5\"), weight 128.2 kg (282 lb 9.6 oz).    Chandana Gómez    "

## 2018-05-24 NOTE — PROGRESS NOTES
"Patient presented to clinic for  and demonstration of the \"HST Device\". Patient was set up and instructed use. Patient verbalized understanding and will be returning device after 10 am.       Patient was given HST sleep logs and written instructions for use.        ELSA Narayanan                      "

## 2018-05-24 NOTE — MR AVS SNAPSHOT
After Visit Summary   5/24/2018    Eleazar Hummel    MRN: [id number]           Patient Information     Date Of Birth          1960        Visit Information        Provider Department      5/24/2018 10:30 AM [facility]EP STUDY RM [facility]        Today's Diagnoses     BRIANNA (obstructive sleep apnea)          Care Instructions    My home sleep study:    ______I will activate the device as shown on the video    ___X___My device is programmed to start automatically at     _____10:00 pm_________ Time   on ____5/24/18_________  Day/Date    My contact number if I have problems is ____[phone]_______________________________      I will watch the video before I hook it up at night: [email or web address]    In case of an emergency call [phone]                    Follow-ups after your visit        Your next 10 appointments already scheduled     May 24, 2018 10:30 AM CDT   HST  with SLEEP STUDY RM 7   [facility] ([facility])    [address] [place] [id number] [phone]            May 25, 2018  9:00 AM CDT   HST Drop Off with DME SCHEDULE   [facility]    [address] [place] [id number] [phone]            May 31, 2018  2:00 PM CDT   Return Sleep Patient with Franck Lake MD   [facility]    [address] [place] [id number] [phone]              Who to contact     If you have questions or need follow up information about today's clinic visit or your schedule please contact [facility] SLEEP STUD[facility] directly at [phone].  Normal or non-critical lab and imaging results will be communicated to you by MyChart, letter or phone within 4 business days after the clinic has received the results. If you do not hear from us within  7 days, please contact the clinic through [employer] or phone. If you have a critical or abnormal lab result, we will notify you by phone as soon as possible.  Submit refill requests through [employer] or call your pharmacy and they will forward the refill request to us. Please allow 3 business days for your refill to be completed.          Additional Information About Your Visit        [employer] Information     [employer] gives you secure access to your electronic health record. If you see a primary care provider, you can also send messages to your care team and make appointments. If you have questions, please call your primary care clinic.  If you do not have a primary care provider, please call [phone] and they will assist you.        Care EveryWhere ID     This is your Care EveryWhere ID. This could be used by other organizations to access your [place] medical records  [id number]         Blood Pressure from Last 3 Encounters:   05/24/18 116/74   03/21/18 115/75   02/14/18 120/79    Weight from Last 3 Encounters:   05/24/18 128.2 kg (282 lb 9.6 oz)   03/21/18 127.7 kg (281 lb 8 oz)   02/22/18 126.6 kg (279 lb)              We Performed the Following     HST-Home Sleep Apnea Test        Primary Care Provider Fax #    [facility] [phone]       [facility] [address]  [facility] [address]        Equal Access to Services     MARIA DEL ROSARIO RIOJAS AH: Hadii aad ku hadasho Soomaali, waaxda luqadaha, qaybta kaalmada adeegyada, sachin hou. So [facility] [phone].    ATENCIÓN: Si habla español, tiene a rincon disposición servicios gratuitos de asistencia lingüística. Naa al [phone].    We comply with applicable federal civil rights laws and [place] laws. We do not discriminate on the basis of race, color, national origin, age, disability, sex, sexual orientation, or gender identity.            Thank you!     Thank you for choosing [facility], SLEEP STUDY   for your care. Our goal is always to provide you with excellent care. Hearing back from our patients is one way we can continue to improve our services. Please take a few minutes to complete the written survey that you may receive in the mail after your visit with us. Thank you!             Your Updated Medication List - Protect others around you: Learn how to safely use, store and throw away your medicines at www.disposemymeds.org.          This list is accurate as of 5/24/18 10:28 AM.  Always use your most recent med list.                   Brand Name Dispense Instructions for use Diagnosis    ADVIL PO

## 2018-05-25 ENCOUNTER — DOCUMENTATION ONLY (OUTPATIENT)
Dept: SLEEP MEDICINE | Facility: CLINIC | Age: 58
End: 2018-05-25
Payer: COMMERCIAL

## 2018-05-25 NOTE — PROGRESS NOTES
This HST performed using a Noxturnal T3 device which recorded snore, sound, movement activity, body position, nasal pressure, oronasal thermal airflow, pulse, oximetry and both chest and abdominal respiratory effort. HST data was confined to the time patients states they were in bed.   Patient was score using 1B rules. Patient respiratory events showed an AHI of 15.9 with no snoring. Overall signal quality was fair. UAS was used during recording.    Pt will follow up with sleep provider to determine appropriate therapy.

## 2018-05-29 NOTE — PROCEDURES
"HOME SLEEP STUDY INTERPRETATION    Patient: Eleazar Hummel  MRN: [id number]  YOB: 1960  Study Date: 5/24/2018  Referring Provider: [facility];   Ordering Provider: Franck Lake MD     Indications for Home Study: Eleazar Hummel is a 58 year old male with a history of severe obstructive sleep apnea who is currently using a hypoglossal neurostimulator at 2-3 volts for management. During recent neurostimulator titration 4/18/18, AHI was 2-8 during non-supine REM monitoring at 2.6-2.7v though his AHI was 58 in supine position.  Estimated body mass index is 33.51 kg/(m^2) as calculated from the following:    Height as of an earlier encounter on 5/24/18: 1.956 m (6' 5\").    Weight as of an earlier encounter on 5/24/18: 128.2 kg (282 lb 9.6 oz).  Total score - [place]: 15 (3/21/2018 10:01 AM)    Data: A full night home sleep study was performed recording the standard physiologic parameters including body position, movement, sound, nasal pressure, thermal oral airflow, chest and abdominal movements with respiratory inductance plethysmography, and oxygen saturation by pulse oximetry. Pulse rate was estimated by oximetry recording. This study was considered adequate based on > 4 hours of quality oximetry and respiratory recording. As specified by the AASM Manual for the Scoring of Sleep and Associated events, version 2.3, Rule VIII.D 1B, 4% oxygen desaturation scoring for hypopneas is used as a standard of care on all home sleep apnea testing.    Analysis Time:  438 minutes    Respiration:   Sleep Associated Hypoxemia: sustained hypoxemia was present. Baseline oxygen saturation was 93%.  Time with saturation less than or equal to 88% was 7 minutes. The lowest oxygen saturation was 80%.   Snoring: Snoring was present.  Respiratory events: The home study revealed a presence of 22 obstructive apneas and 0 mixed and central apneas. There were 43 hypopneas resulting in a combined " apnea/hypopnea index [AHI] of 15.9 events per hour.  AHI was 46 per hour supine, - per hour prone, 10 per hour on left side, and 7 per hour on right side.   Pattern: Excluding events noted above, respiratory rate and pattern was Normal.    Position: Percent of time spent: supine - 21%, prone - -%, on left - 46%, on right - 32%.    Heart Rate: By pulse oximetry normal rate was noted.     Assessment:   Moderate obstructive sleep apnea during hypoglossal neurostimulation with most events occurring in the supine postion [AHI 46 supine position and AHI 7-10 in decubitus positions].  Sleep associated hypoxemia was present.    Recommendations:  Consider adding positional therapy at current setting or reprogramming voltage or electrode configuration.  Suggest optimizing sleep hygiene and avoiding sleep deprivation.  Successful weight loss may improve sleep apnea in some individuals.    Diagnosis Code(s): Obstructive Sleep Apnea G47.33, Hypoxemia G47.36    DAYNA MEJÍA MD, May 29, 2018   Diplomate, American Board of Internal Medicine

## 2018-05-31 ENCOUNTER — OFFICE VISIT (OUTPATIENT)
Dept: SLEEP MEDICINE | Facility: CLINIC | Age: 58
End: 2018-05-31
Payer: COMMERCIAL

## 2018-05-31 VITALS
SYSTOLIC BLOOD PRESSURE: 118 MMHG | WEIGHT: 283 LBS | RESPIRATION RATE: 16 BRPM | OXYGEN SATURATION: 95 % | BODY MASS INDEX: 33.42 KG/M2 | DIASTOLIC BLOOD PRESSURE: 76 MMHG | HEIGHT: 77 IN | HEART RATE: 81 BPM

## 2018-05-31 DIAGNOSIS — G47.33 OBSTRUCTIVE SLEEP APNEA: Primary | ICD-10-CM

## 2018-05-31 PROCEDURE — 99214 OFFICE O/P EST MOD 30 MIN: CPT | Performed by: INTERNAL MEDICINE

## 2018-05-31 NOTE — PROGRESS NOTES
[facility]  Outpatient Sleep Medicine Consultation  May 31, 2018      Name: Eleazar Hummel MRN# [id number]   Age: 58 year old YOB: 1960     Date of Consultation: May 31, 2018  Consultation is requested by: No referring provider defined for this encounter.  Primary care provider: [facility]             Assessment and Plan:       Summary Diagnoses:      Obstructive sleep apnea currently treated with hypoglossal nerve stimulator at 2.7 V with persistent supine obstructive events    Obesity    Summary Recommendations:    Increase stimulator voltage to 2.8-2.9 volts if tolerated.  Patient provided with information regarding sleep positioning device as alternative therapy.  If he has persistent symptoms he will return for consideration of reprogramming of device.             History of Present Illness:     Eleazar Hummel is a 58 year old male with previously severe obstructive sleep apnea currently treated with hypoglossal neurostimulator with resolution of snoring but persistent supine apnea on home sleep testing.  He is currently denying sleep disruption or daytime sleepiness.           Medications:     Current Outpatient Prescriptions   Medication Sig     Ibuprofen (ADVIL PO)      No current facility-administered medications for this visit.         No Known Allergies         Past Medical History:     Does not need 02 supplement at night   Past Medical History:   Diagnosis Date     Obstructive sleep apnea 2009             Past Surgical History:    No  h/o  upper airway surgery  Past Surgical History:   Procedure Laterality Date     BACK SURGERY  1998    bulging disk     Bladder surgery  1971     COLONOSCOPY       ENDOSCOPY DRUG INDUCED SLEEP N/A 6/28/2017    Procedure: ENDOSCOPY DRUG INDUCED SLEEP;;  Surgeon: Rachael Dover MD;  Location: [facility] OR     IMPLANT GENERATOR STIMULATOR (LOCATION) Left 2/14/2018    Procedure: IMPLANT GENERATOR STIMULATOR  "(LOCATION);  Left Hypoglossal Nerve Stimulator Implant;  Surgeon: Rachael Dover MD;  Location: [facility] OR     Implantation of hypoglossal nerve stimulator Right 2010     REMOVE GENERATOR STIMULATOR (LOCATION) Right 6/28/2017    Procedure: REMOVE GENERATOR STIMULATOR (LOCATION);  Drug Induced Sleep Endoscopy, Removal of Apnex Implant, Right;  Surgeon: Rachael Dover MD;  Location: [facility] OR     Revision of right hypoglossal nerve stimulator  2011                      Physical Examination:   /76  Pulse 81  Resp 16  Ht 1.959 m (6' 5.13\")  Wt 128.4 kg (283 lb)  SpO2 95%  BMI 33.45 kg/m2             Copy to: [facility], [facility]      DAYNA MEJÍA MD 5/31/2018     [facility]   Floor 1, [address]   ?[address] [place] [address]   Appointments: [phone]    [facility]  3rd Floor  [address], [place] [address]     Total time spent with patient: 25 min >50% counseling  "

## 2018-05-31 NOTE — NURSING NOTE
"    Chief Complaint   Patient presents with     Study Results     HST       Initial /76  Pulse 81  Resp 16  Ht 1.959 m (6' 5.13\")  Wt 128.4 kg (283 lb)  SpO2 95%  BMI 33.45 kg/m2 Estimated body mass index is 33.45 kg/(m^2) as calculated from the following:    Height as of this encounter: 1.959 m (6' 5.13\").    Weight as of this encounter: 128.4 kg (283 lb).    Medication Reconciliation: complete    Neck circumference:  inches /  centimeters.    DME:     [facility]       "

## 2018-05-31 NOTE — PATIENT INSTRUCTIONS
Devices that maintain sleeping on the back to prevent snoring and mild sleep apnea.    Http://zzomaosa.com/  Http://nightshifttherapy.com/  http://www.[employer].com.au/product.html      Your BMI is Body mass index is 33.45 kg/(m^2).  Weight management is a personal decision.  If you are interested in exploring weight loss strategies, the following discussion covers the approaches that may be successful. Body mass index (BMI) is one way to tell whether you are at a healthy weight, overweight, or obese. It measures your weight in relation to your height.  A BMI of 18.5 to 24.9 is in the healthy range. A person with a BMI of 25 to 29.9 is considered overweight, and someone with a BMI of 30 or greater is considered obese. More than two-thirds of American adults are considered overweight or obese.  Being overweight or obese increases the risk for further weight gain. Excess weight may lead to heart disease and diabetes.  Creating and following plans for healthy eating and physical activity may help you improve your health.  Weight control is part of healthy lifestyle and includes exercise, emotional health, and healthy eating habits. Careful eating habits lifelong are the mainstay of weight control. Though there are significant health benefits from weight loss, long-term weight loss with diet alone may be very difficult to achieve- studies show long-term success with dietary management in less than 10% of people. Attaining a healthy weight may be especially difficult to achieve in those with severe obesity. In some cases, medications, devices and surgical management might be considered.  What can you do?  If you are overweight or obese and are interested in methods for weight loss, you should discuss this with your provider.     Consider reducing daily calorie intake by 500 calories.     Keep a food journal.     Avoiding skipping meals, consider cutting portions instead.    Diet combined with exercise helps maintain muscle  while optimizing fat loss. Strength training is particularly important for building and maintaining muscle mass. Exercise helps reduce stress, increase energy, and improves fitness. Increasing exercise without diet control, however, may not burn enough calories to loose weight.       Start walking three days a week 10-20 minutes at a time    Work towards walking thirty minutes five days a week     Eventually, increase the speed of your walking for 1-2 minutes at time    In addition, we recommend that you review healthy lifestyles and methods for weight loss available through the National Institutes of Health patient information sites:  http://win.niddk.nih.gov/publications/index.htm    And look into health and wellness programs that may be available through your health insurance provider, employer, local community center, or richar club.    Weight management plan: Patient was referred to their PCP to discuss a diet and exercise plan.

## 2018-05-31 NOTE — MR AVS SNAPSHOT
After Visit Summary   5/31/2018    Eleazar Hummel    MRN: [id number]           Patient Information     Date Of Birth          1960        Visit Information        Provider Department      5/31/2018 2:00 PM Franck Lake MD [facility] [place], Sleep Study        Today's Diagnoses     Obstructive sleep apnea    -  1      Care Instructions    Devices that maintain sleeping on the back to prevent snoring and mild sleep apnea.    Http://zzomaosa.com/  Http://nightshifttherapy.com/  http://www.[email or web address]/product.html      Your BMI is Body mass index is 33.45 kg/(m^2).  Weight management is a personal decision.  If you are interested in exploring weight loss strategies, the following discussion covers the approaches that may be successful. Body mass index (BMI) is one way to tell whether you are at a healthy weight, overweight, or obese. It measures your weight in relation to your height.  A BMI of 18.5 to 24.9 is in the healthy range. A person with a BMI of 25 to 29.9 is considered overweight, and someone with a BMI of 30 or greater is considered obese. More than two-thirds of American adults are considered overweight or obese.  Being overweight or obese increases the risk for further weight gain. Excess weight may lead to heart disease and diabetes.  Creating and following plans for healthy eating and physical activity may help you improve your health.  Weight control is part of healthy lifestyle and includes exercise, emotional health, and healthy eating habits. Careful eating habits lifelong are the mainstay of weight control. Though there are significant health benefits from weight loss, long-term weight loss with diet alone may be very difficult to achieve- studies show long-term success with dietary management in less than 10% of people. Attaining a healthy weight may be especially difficult to achieve in those with severe obesity. In some cases, medications, devices and surgical management  might be considered.  What can you do?  If you are overweight or obese and are interested in methods for weight loss, you should discuss this with your provider.     Consider reducing daily calorie intake by 500 calories.     Keep a food journal.     Avoiding skipping meals, consider cutting portions instead.    Diet combined with exercise helps maintain muscle while optimizing fat loss. Strength training is particularly important for building and maintaining muscle mass. Exercise helps reduce stress, increase energy, and improves fitness. Increasing exercise without diet control, however, may not burn enough calories to loose weight.       Start walking three days a week 10-20 minutes at a time    Work towards walking thirty minutes five days a week     Eventually, increase the speed of your walking for 1-2 minutes at time    In addition, we recommend that you review healthy lifestyles and methods for weight loss available through the National Institutes of Health patient information sites:  http://win.niddk.nih.gov/publications/index.htm    And look into health and wellness programs that may be available through your health insurance provider, employer, local community center, or richar club.    Weight management plan: Patient was referred to their PCP to discuss a diet and exercise plan.              Follow-ups after your visit        Follow-up notes from your care team     Return in about 1 year (around 5/31/2019).      Who to contact     If you have questions or need follow up information about today's clinic visit or your schedule please [facility] directly at [phone].  Normal or non-critical lab and imaging results will be communicated to you by MyChart, letter or phone within 4 business days after the clinic has received the results. If you do not hear from us within 7 days, please contact the clinic through MyChart or phone. If you have a critical or abnormal lab result, we will  "notify you by phone as soon as possible.  Submit refill requests through [employer] or call your pharmacy and they will forward the refill request to us. Please allow 3 business days for your refill to be completed.          Additional Information About Your Visit        [employer] System[employer] Information     [employer] gives you secure access to your electronic health record. If you see a primary care provider, you can also send messages to your care team and make appointments. If you have questions, please call your primary care clinic.  If you do not have a primary care provider, please call [phone] and they will assist you.        Care EveryWhere ID     This is your Care EveryWhere ID. This could be used by other organizations to access your [facility] medical records  [id number]        Your Vitals Were     Pulse Respirations Height Pulse Oximetry BMI (Body Mass Index)       81 16 1.959 m (6' 5.13\") 95% 33.45 kg/m2        Blood Pressure from Last 3 Encounters:   05/31/18 118/76   05/24/18 116/74   03/21/18 115/75    Weight from Last 3 Encounters:   05/31/18 128.4 kg (283 lb)   05/24/18 128.2 kg (282 lb 9.6 oz)   03/21/18 127.7 kg (281 lb 8 oz)              Today, you had the following     No orders found for display       Primary Care Provider Fax #    [facility] [phone]       [facility] [address]  [facility] [address]        Equal Access to Services     MARIA DEL ROSARIO RIOJAS AH: Hadii aad ku hadasho Soomaali, waaxda luqadaha, qaybta kaalmada adeegyada, sachin feliz hayzayra hou. So [facility] [phone].    ATENCIÓN: Si habla español, tiene a rincon disposición servicios gratuitos de asistencia lingüística. Llame al [phone].    We comply with applicable federal civil rights laws and [place] laws. We do not discriminate on the basis of race, color, national origin, age, disability, sex, sexual orientation, or gender identity.            Thank you!     Thank you for choosing [facility]  for your care. Our goal is always to provide you with excellent care. Hearing back from our patients is one way we can continue to improve our services. Please take a few minutes to complete the written survey that you may receive in the mail after your visit with us. Thank you!             Your Updated Medication List - Protect others around you: Learn how to safely use, store and throw away your medicines at www.disposemymeds.org.          This list is accurate as of 5/31/18  3:00 PM.  Always use your most recent med list.                   Brand Name Dispense Instructions for use Diagnosis    ADVIL PO

## 2018-08-24 ENCOUNTER — TELEPHONE (OUTPATIENT)
Dept: SLEEP MEDICINE | Facility: CLINIC | Age: 58
End: 2018-08-24

## 2019-01-29 ENCOUNTER — DOCUMENTATION ONLY (OUTPATIENT)
Dept: SLEEP MEDICINE | Facility: CLINIC | Age: 59
End: 2019-01-29
Payer: COMMERCIAL

## 2019-02-01 ENCOUNTER — DOCUMENTATION ONLY (OUTPATIENT)
Dept: SLEEP MEDICINE | Facility: CLINIC | Age: 59
End: 2019-02-01
Payer: COMMERCIAL

## 2019-02-04 ENCOUNTER — DOCUMENTATION ONLY (OUTPATIENT)
Dept: SLEEP MEDICINE | Facility: CLINIC | Age: 59
End: 2019-02-04
Payer: COMMERCIAL

## 2019-02-07 ENCOUNTER — TELEPHONE (OUTPATIENT)
Dept: SLEEP MEDICINE | Facility: CLINIC | Age: 59
End: 2019-02-07

## 2019-02-14 ENCOUNTER — DOCUMENTATION ONLY (OUTPATIENT)
Dept: SLEEP MEDICINE | Facility: CLINIC | Age: 59
End: 2019-02-14
Payer: COMMERCIAL

## 2019-02-14 NOTE — PROGRESS NOTES
Patient came in to have his bed and pause change. Both were changed from 30 minutes to 20 minutes. Patient feels great and is sleeping great and had no other questions or concerns.

## 2019-04-22 ENCOUNTER — DOCUMENTATION ONLY (OUTPATIENT)
Dept: SLEEP MEDICINE | Facility: CLINIC | Age: 59
End: 2019-04-22

## 2019-04-22 NOTE — PROGRESS NOTES
Patient remote went though the washing machine and now needs a new remote. Will go over the ordering process tomorrow.

## 2019-05-01 ENCOUNTER — DOCUMENTATION ONLY (OUTPATIENT)
Dept: SLEEP MEDICINE | Facility: CLINIC | Age: 59
End: 2019-05-01
Payer: COMMERCIAL

## 2019-05-01 NOTE — PROGRESS NOTES
UAS: Called patient and left message to return call. Patient Prescription was signed sent to [employer] to get a new remote.

## 2019-05-02 ENCOUNTER — DOCUMENTATION ONLY (OUTPATIENT)
Dept: SLEEP MEDICINE | Facility: CLINIC | Age: 59
End: 2019-05-02
Payer: COMMERCIAL

## 2019-05-02 NOTE — PROGRESS NOTES
UAS Re-Check: Patient send an email saying [employer] still hasn't received a prescription for his new Inspire remote. Spoke with customer service and a Pharmacy tech. Stated I sent the prescription twice and sent a third one to their hard fax line and waiting to here back from [employer].. Called patient and left a message in where we are in the process.

## 2019-05-09 ENCOUNTER — DOCUMENTATION ONLY (OUTPATIENT)
Dept: SLEEP MEDICINE | Facility: CLINIC | Age: 59
End: 2019-05-09
Payer: COMMERCIAL

## 2019-10-01 ENCOUNTER — HEALTH MAINTENANCE LETTER (OUTPATIENT)
Age: 59
End: 2019-10-01

## 2021-01-15 ENCOUNTER — HEALTH MAINTENANCE LETTER (OUTPATIENT)
Age: 61
End: 2021-01-15

## 2021-09-04 ENCOUNTER — HEALTH MAINTENANCE LETTER (OUTPATIENT)
Age: 61
End: 2021-09-04

## 2022-02-19 ENCOUNTER — HEALTH MAINTENANCE LETTER (OUTPATIENT)
Age: 62
End: 2022-02-19

## 2022-10-16 ENCOUNTER — HEALTH MAINTENANCE LETTER (OUTPATIENT)
Age: 62
End: 2022-10-16

## 2023-04-01 ENCOUNTER — HEALTH MAINTENANCE LETTER (OUTPATIENT)
Age: 63
End: 2023-04-01

## 2024-06-01 ENCOUNTER — HEALTH MAINTENANCE LETTER (OUTPATIENT)
Age: 64
End: 2024-06-01

## (undated) DEVICE — DRAPE POUCH IRR 1016

## (undated) DEVICE — LINEN TOWEL PACK X5 5464

## (undated) DEVICE — SU VICRYL 3-0 SH 8X18" UND J864D

## (undated) DEVICE — PREP POVIDONE IODINE SOLUTION 10% 120ML

## (undated) DEVICE — SU MONOCRYL 5-0 PS-2 18" UND Y495G

## (undated) DEVICE — NIM PROBE STIMULATOR SIDE-BY-SIDE BIPOLAR 8225401

## (undated) DEVICE — SOL WATER IRRIG 1000ML BOTTLE 2F7114

## (undated) DEVICE — SYR EAR BULB 3OZ 0035830

## (undated) DEVICE — RETR RING LONE STAR 14.1X14.1CM 3307G

## (undated) DEVICE — SU SILK 2-0 SH 30" K833H

## (undated) DEVICE — SU SILK 3-0 TIE 12X30" A304H

## (undated) DEVICE — BLADE KNIFE SURG 15 371115

## (undated) DEVICE — SU ETHILON 4-0 P-3 18" BLACK 699G

## (undated) DEVICE — DRAPE STERI TOWEL LG 1010

## (undated) DEVICE — COVER NEOPROBE SOFTFLEX 5X96" W/BANDS 20-PC596

## (undated) DEVICE — GLOVE PROTEXIS POWDER FREE SMT 6.5  2D72PT65X

## (undated) DEVICE — SPONGE COTTONOID 1/2X1/2" 20-04S

## (undated) DEVICE — CLIP HORIZON MED BLUE 002200

## (undated) DEVICE — DRSG GAUZE 4X4" TRAY 6939

## (undated) DEVICE — CONTROL APPLIANCE SLEEP REMOTE INSPIRE 2580

## (undated) DEVICE — ANTIFOG SOLUTION W/FOAM PAD 31142527

## (undated) DEVICE — SOL NACL 0.9% 10ML VIAL 0409-4888-02

## (undated) DEVICE — RETR ELASTIC STAYS LONE STAR BLUNT DUAL LEAD 3550-1G

## (undated) DEVICE — SU VICRYL 4-0 SH 27" UND J415H

## (undated) DEVICE — SPONGE COTTONOID 1/2X2" 20-32S

## (undated) DEVICE — JELLY LUBRICATING SURGILUBE 2OZ TUBE

## (undated) DEVICE — BLADE CLIPPER SGL USE 9680

## (undated) DEVICE — LABEL MEDICATION SYSTEM 3303-P

## (undated) DEVICE — SOL NACL 0.9% IRRIG 1000ML BOTTLE 2F7124

## (undated) DEVICE — TUNNELING TOOL AND OBTURATOR

## (undated) DEVICE — TONGUE DEPRESSOR STERILE 6023

## (undated) DEVICE — CLIP HORIZON SM RED WIDE SLOT 001201

## (undated) DEVICE — NDL ANGIOCATH 18GA 1.25" 4055

## (undated) DEVICE — PACK ENT MINOR CUSTOM ASC

## (undated) DEVICE — DRSG TEGADERM 2 3/8X2 3/4" 1624W

## (undated) DEVICE — SYR 10ML FINGER CONTROL W/O NDL 309695

## (undated) DEVICE — DRAPE IOBAN INCISE 23X17" 6650EZ

## (undated) DEVICE — PREP POVIDONE IODINE SCRUB 7.5% 120ML

## (undated) DEVICE — EYE PREP BETADINE 5% SOLUTION 30ML 0065-0411-30

## (undated) DEVICE — PREP SKIN SCRUB TRAY 4461A

## (undated) DEVICE — DRAPE MICRO ZEISS PENTERO 120X54" G650DL

## (undated) DEVICE — SYR 01ML TBC SLIP TIP W/O NDL

## (undated) DEVICE — SU SILK 3-0 SH CR 8X18" C013D

## (undated) DEVICE — VESSEL LOOPS RED MINI 31145710

## (undated) DEVICE — SYR 03ML LL W/O NDL

## (undated) DEVICE — SPONGE KITTNER 30-101

## (undated) DEVICE — NDL 25GA 1.5" 305127

## (undated) RX ORDER — OXYMETAZOLINE HYDROCHLORIDE 0.05 G/100ML
SPRAY NASAL
Status: DISPENSED
Start: 2017-06-28

## (undated) RX ORDER — CEFAZOLIN SODIUM 1 G/3ML
INJECTION, POWDER, FOR SOLUTION INTRAMUSCULAR; INTRAVENOUS
Status: DISPENSED
Start: 2018-02-14

## (undated) RX ORDER — BUPIVACAINE HYDROCHLORIDE 2.5 MG/ML
INJECTION, SOLUTION EPIDURAL; INFILTRATION; INTRACAUDAL
Status: DISPENSED
Start: 2018-02-14

## (undated) RX ORDER — FENTANYL CITRATE 50 UG/ML
INJECTION, SOLUTION INTRAMUSCULAR; INTRAVENOUS
Status: DISPENSED
Start: 2017-06-28

## (undated) RX ORDER — ONDANSETRON 2 MG/ML
INJECTION INTRAMUSCULAR; INTRAVENOUS
Status: DISPENSED
Start: 2018-02-14

## (undated) RX ORDER — EPHEDRINE SULFATE 50 MG/ML
INJECTION, SOLUTION INTRAMUSCULAR; INTRAVENOUS; SUBCUTANEOUS
Status: DISPENSED
Start: 2018-02-14

## (undated) RX ORDER — PROPOFOL 10 MG/ML
INJECTION, EMULSION INTRAVENOUS
Status: DISPENSED
Start: 2018-02-14

## (undated) RX ORDER — LIDOCAINE HYDROCHLORIDE 20 MG/ML
INJECTION, SOLUTION EPIDURAL; INFILTRATION; INTRACAUDAL; PERINEURAL
Status: DISPENSED
Start: 2018-02-14

## (undated) RX ORDER — DEXAMETHASONE SODIUM PHOSPHATE 10 MG/ML
INJECTION, SOLUTION INTRAMUSCULAR; INTRAVENOUS
Status: DISPENSED
Start: 2018-02-14

## (undated) RX ORDER — EPHEDRINE SULFATE 50 MG/ML
INJECTION, SOLUTION INTRAMUSCULAR; INTRAVENOUS; SUBCUTANEOUS
Status: DISPENSED
Start: 2017-06-28

## (undated) RX ORDER — GABAPENTIN 300 MG/1
CAPSULE ORAL
Status: DISPENSED
Start: 2018-02-14

## (undated) RX ORDER — PHENYLEPHRINE HCL IN 0.9% NACL 1 MG/10 ML
SYRINGE (ML) INTRAVENOUS
Status: DISPENSED
Start: 2017-06-28

## (undated) RX ORDER — EPINEPHRINE 1 MG/ML
INJECTION, SOLUTION, CONCENTRATE INTRAVENOUS
Status: DISPENSED
Start: 2018-02-14

## (undated) RX ORDER — GLYCOPYRROLATE 0.2 MG/ML
INJECTION, SOLUTION INTRAMUSCULAR; INTRAVENOUS
Status: DISPENSED
Start: 2017-06-28

## (undated) RX ORDER — OXYMETAZOLINE HYDROCHLORIDE 0.05 G/100ML
SPRAY NASAL
Status: DISPENSED
Start: 2018-02-14

## (undated) RX ORDER — PROPOFOL 10 MG/ML
INJECTION, EMULSION INTRAVENOUS
Status: DISPENSED
Start: 2017-06-28

## (undated) RX ORDER — BACITRACIN 50000 [IU]/1
INJECTION, POWDER, FOR SOLUTION INTRAMUSCULAR
Status: DISPENSED
Start: 2018-02-14

## (undated) RX ORDER — REMIFENTANIL HYDROCHLORIDE 1 MG/ML
INJECTION, POWDER, LYOPHILIZED, FOR SOLUTION INTRAVENOUS
Status: DISPENSED
Start: 2018-02-14

## (undated) RX ORDER — HYDROCODONE BITARTRATE AND ACETAMINOPHEN 5; 325 MG/1; MG/1
TABLET ORAL
Status: DISPENSED
Start: 2017-06-28

## (undated) RX ORDER — ACETAMINOPHEN 325 MG/1
TABLET ORAL
Status: DISPENSED
Start: 2018-02-14

## (undated) RX ORDER — HYDROMORPHONE HYDROCHLORIDE 1 MG/ML
INJECTION, SOLUTION INTRAMUSCULAR; INTRAVENOUS; SUBCUTANEOUS
Status: DISPENSED
Start: 2018-02-14

## (undated) RX ORDER — FENTANYL CITRATE 50 UG/ML
INJECTION, SOLUTION INTRAMUSCULAR; INTRAVENOUS
Status: DISPENSED
Start: 2018-02-14

## (undated) RX ORDER — LIDOCAINE HYDROCHLORIDE 10 MG/ML
INJECTION, SOLUTION EPIDURAL; INFILTRATION; INTRACAUDAL; PERINEURAL
Status: DISPENSED
Start: 2018-02-14

## (undated) RX ORDER — KETOROLAC TROMETHAMINE 30 MG/ML
INJECTION, SOLUTION INTRAMUSCULAR; INTRAVENOUS
Status: DISPENSED
Start: 2018-02-14

## (undated) RX ORDER — BACITRACIN 500 [USP'U]/G
OINTMENT OPHTHALMIC
Status: DISPENSED
Start: 2018-02-14